# Patient Record
Sex: FEMALE | Race: BLACK OR AFRICAN AMERICAN | NOT HISPANIC OR LATINO | URBAN - METROPOLITAN AREA
[De-identification: names, ages, dates, MRNs, and addresses within clinical notes are randomized per-mention and may not be internally consistent; named-entity substitution may affect disease eponyms.]

---

## 2022-07-03 ENCOUNTER — INPATIENT (INPATIENT)
Facility: HOSPITAL | Age: 32
LOS: 4 days | Discharge: HOME | End: 2022-07-08
Attending: STUDENT IN AN ORGANIZED HEALTH CARE EDUCATION/TRAINING PROGRAM | Admitting: STUDENT IN AN ORGANIZED HEALTH CARE EDUCATION/TRAINING PROGRAM

## 2022-07-03 VITALS
TEMPERATURE: 98 F | RESPIRATION RATE: 17 BRPM | OXYGEN SATURATION: 100 % | HEART RATE: 113 BPM | SYSTOLIC BLOOD PRESSURE: 129 MMHG | DIASTOLIC BLOOD PRESSURE: 71 MMHG

## 2022-07-03 LAB
ALBUMIN SERPL ELPH-MCNC: 4.5 G/DL — SIGNIFICANT CHANGE UP (ref 3.3–5)
ALP SERPL-CCNC: 111 U/L — SIGNIFICANT CHANGE UP (ref 40–120)
ALT FLD-CCNC: 310 U/L — HIGH (ref 4–33)
ANION GAP SERPL CALC-SCNC: 19 MMOL/L — HIGH (ref 7–14)
APPEARANCE UR: ABNORMAL
AST SERPL-CCNC: 160 U/L — HIGH (ref 4–32)
BACTERIA # UR AUTO: NEGATIVE — SIGNIFICANT CHANGE UP
BASOPHILS # BLD AUTO: 0.02 K/UL — SIGNIFICANT CHANGE UP (ref 0–0.2)
BASOPHILS NFR BLD AUTO: 0.2 % — SIGNIFICANT CHANGE UP (ref 0–2)
BILIRUB SERPL-MCNC: 4.3 MG/DL — HIGH (ref 0.2–1.2)
BILIRUB UR-MCNC: ABNORMAL
BUN SERPL-MCNC: 8 MG/DL — SIGNIFICANT CHANGE UP (ref 7–23)
CALCIUM SERPL-MCNC: 9.5 MG/DL — SIGNIFICANT CHANGE UP (ref 8.4–10.5)
CHLORIDE SERPL-SCNC: 102 MMOL/L — SIGNIFICANT CHANGE UP (ref 98–107)
CO2 SERPL-SCNC: 18 MMOL/L — LOW (ref 22–31)
COLOR SPEC: ABNORMAL
CREAT SERPL-MCNC: 0.67 MG/DL — SIGNIFICANT CHANGE UP (ref 0.5–1.3)
DIFF PNL FLD: NEGATIVE — SIGNIFICANT CHANGE UP
EGFR: 120 ML/MIN/1.73M2 — SIGNIFICANT CHANGE UP
EOSINOPHIL # BLD AUTO: 0.01 K/UL — SIGNIFICANT CHANGE UP (ref 0–0.5)
EOSINOPHIL NFR BLD AUTO: 0.1 % — SIGNIFICANT CHANGE UP (ref 0–6)
EPI CELLS # UR: 1 /HPF — SIGNIFICANT CHANGE UP (ref 0–5)
FLUAV AG NPH QL: SIGNIFICANT CHANGE UP
FLUBV AG NPH QL: SIGNIFICANT CHANGE UP
GLUCOSE SERPL-MCNC: 162 MG/DL — HIGH (ref 70–99)
GLUCOSE UR QL: NEGATIVE — SIGNIFICANT CHANGE UP
HCT VFR BLD CALC: 39.6 % — SIGNIFICANT CHANGE UP (ref 34.5–45)
HGB BLD-MCNC: 13.1 G/DL — SIGNIFICANT CHANGE UP (ref 11.5–15.5)
HYALINE CASTS # UR AUTO: 2 /LPF — SIGNIFICANT CHANGE UP (ref 0–7)
IANC: 9.12 K/UL — HIGH (ref 1.8–7.4)
IMM GRANULOCYTES NFR BLD AUTO: 0.4 % — SIGNIFICANT CHANGE UP (ref 0–1.5)
KETONES UR-MCNC: ABNORMAL
LEUKOCYTE ESTERASE UR-ACNC: NEGATIVE — SIGNIFICANT CHANGE UP
LIDOCAIN IGE QN: >3000 U/L — HIGH (ref 7–60)
LYMPHOCYTES # BLD AUTO: 1.12 K/UL — SIGNIFICANT CHANGE UP (ref 1–3.3)
LYMPHOCYTES # BLD AUTO: 9.9 % — LOW (ref 13–44)
MCHC RBC-ENTMCNC: 28.1 PG — SIGNIFICANT CHANGE UP (ref 27–34)
MCHC RBC-ENTMCNC: 33.1 GM/DL — SIGNIFICANT CHANGE UP (ref 32–36)
MCV RBC AUTO: 84.8 FL — SIGNIFICANT CHANGE UP (ref 80–100)
MONOCYTES # BLD AUTO: 0.98 K/UL — HIGH (ref 0–0.9)
MONOCYTES NFR BLD AUTO: 8.7 % — SIGNIFICANT CHANGE UP (ref 2–14)
NEUTROPHILS # BLD AUTO: 9.12 K/UL — HIGH (ref 1.8–7.4)
NEUTROPHILS NFR BLD AUTO: 80.7 % — HIGH (ref 43–77)
NITRITE UR-MCNC: NEGATIVE — SIGNIFICANT CHANGE UP
NRBC # BLD: 0 /100 WBCS — SIGNIFICANT CHANGE UP
NRBC # FLD: 0 K/UL — SIGNIFICANT CHANGE UP
PH UR: 6.5 — SIGNIFICANT CHANGE UP (ref 5–8)
PLATELET # BLD AUTO: 366 K/UL — SIGNIFICANT CHANGE UP (ref 150–400)
POTASSIUM SERPL-MCNC: 3.2 MMOL/L — LOW (ref 3.5–5.3)
POTASSIUM SERPL-SCNC: 3.2 MMOL/L — LOW (ref 3.5–5.3)
PROT SERPL-MCNC: 8.5 G/DL — HIGH (ref 6–8.3)
PROT UR-MCNC: ABNORMAL
RBC # BLD: 4.67 M/UL — SIGNIFICANT CHANGE UP (ref 3.8–5.2)
RBC # FLD: 12.2 % — SIGNIFICANT CHANGE UP (ref 10.3–14.5)
RBC CASTS # UR COMP ASSIST: 1 /HPF — SIGNIFICANT CHANGE UP (ref 0–4)
RSV RNA NPH QL NAA+NON-PROBE: SIGNIFICANT CHANGE UP
SARS-COV-2 RNA SPEC QL NAA+PROBE: SIGNIFICANT CHANGE UP
SODIUM SERPL-SCNC: 139 MMOL/L — SIGNIFICANT CHANGE UP (ref 135–145)
SP GR SPEC: 1.02 — SIGNIFICANT CHANGE UP (ref 1–1.05)
UROBILINOGEN FLD QL: ABNORMAL
WBC # BLD: 11.29 K/UL — HIGH (ref 3.8–10.5)
WBC # FLD AUTO: 11.29 K/UL — HIGH (ref 3.8–10.5)
WBC UR QL: 1 /HPF — SIGNIFICANT CHANGE UP (ref 0–5)

## 2022-07-03 PROCEDURE — 99285 EMERGENCY DEPT VISIT HI MDM: CPT

## 2022-07-03 PROCEDURE — 76705 ECHO EXAM OF ABDOMEN: CPT | Mod: 26

## 2022-07-03 RX ORDER — PIPERACILLIN AND TAZOBACTAM 4; .5 G/20ML; G/20ML
3.38 INJECTION, POWDER, LYOPHILIZED, FOR SOLUTION INTRAVENOUS ONCE
Refills: 0 | Status: COMPLETED | OUTPATIENT
Start: 2022-07-03 | End: 2022-07-03

## 2022-07-03 RX ORDER — SODIUM,POTASSIUM PHOSPHATES 278-250MG
250 POWDER IN PACKET (EA) ORAL ONCE
Refills: 0 | Status: DISCONTINUED | OUTPATIENT
Start: 2022-07-03 | End: 2022-07-03

## 2022-07-03 RX ORDER — MORPHINE SULFATE 50 MG/1
4 CAPSULE, EXTENDED RELEASE ORAL ONCE
Refills: 0 | Status: DISCONTINUED | OUTPATIENT
Start: 2022-07-03 | End: 2022-07-03

## 2022-07-03 RX ORDER — ONDANSETRON 8 MG/1
4 TABLET, FILM COATED ORAL ONCE
Refills: 0 | Status: COMPLETED | OUTPATIENT
Start: 2022-07-03 | End: 2022-07-03

## 2022-07-03 RX ORDER — ACETAMINOPHEN 500 MG
1000 TABLET ORAL ONCE
Refills: 0 | Status: COMPLETED | OUTPATIENT
Start: 2022-07-03 | End: 2022-07-03

## 2022-07-03 RX ORDER — SODIUM CHLORIDE 9 MG/ML
1000 INJECTION INTRAMUSCULAR; INTRAVENOUS; SUBCUTANEOUS ONCE
Refills: 0 | Status: COMPLETED | OUTPATIENT
Start: 2022-07-03 | End: 2022-07-03

## 2022-07-03 RX ORDER — POTASSIUM CHLORIDE 20 MEQ
40 PACKET (EA) ORAL ONCE
Refills: 0 | Status: COMPLETED | OUTPATIENT
Start: 2022-07-03 | End: 2022-07-03

## 2022-07-03 RX ORDER — SODIUM,POTASSIUM PHOSPHATES 278-250MG
1 POWDER IN PACKET (EA) ORAL ONCE
Refills: 0 | Status: COMPLETED | OUTPATIENT
Start: 2022-07-03 | End: 2022-07-03

## 2022-07-03 RX ORDER — FAMOTIDINE 10 MG/ML
20 INJECTION INTRAVENOUS ONCE
Refills: 0 | Status: COMPLETED | OUTPATIENT
Start: 2022-07-03 | End: 2022-07-03

## 2022-07-03 RX ORDER — METOCLOPRAMIDE HCL 10 MG
10 TABLET ORAL ONCE
Refills: 0 | Status: COMPLETED | OUTPATIENT
Start: 2022-07-03 | End: 2022-07-03

## 2022-07-03 RX ORDER — DIPHENHYDRAMINE HCL 50 MG
50 CAPSULE ORAL ONCE
Refills: 0 | Status: COMPLETED | OUTPATIENT
Start: 2022-07-03 | End: 2022-07-03

## 2022-07-03 RX ADMIN — Medication 50 MILLIGRAM(S): at 19:46

## 2022-07-03 RX ADMIN — PIPERACILLIN AND TAZOBACTAM 200 GRAM(S): 4; .5 INJECTION, POWDER, LYOPHILIZED, FOR SOLUTION INTRAVENOUS at 23:45

## 2022-07-03 RX ADMIN — FAMOTIDINE 20 MILLIGRAM(S): 10 INJECTION INTRAVENOUS at 19:46

## 2022-07-03 RX ADMIN — MORPHINE SULFATE 4 MILLIGRAM(S): 50 CAPSULE, EXTENDED RELEASE ORAL at 21:22

## 2022-07-03 RX ADMIN — Medication 1 TABLET(S): at 22:59

## 2022-07-03 RX ADMIN — SODIUM CHLORIDE 1000 MILLILITER(S): 9 INJECTION INTRAMUSCULAR; INTRAVENOUS; SUBCUTANEOUS at 21:00

## 2022-07-03 RX ADMIN — ONDANSETRON 4 MILLIGRAM(S): 8 TABLET, FILM COATED ORAL at 20:38

## 2022-07-03 RX ADMIN — SODIUM CHLORIDE 1000 MILLILITER(S): 9 INJECTION INTRAMUSCULAR; INTRAVENOUS; SUBCUTANEOUS at 19:45

## 2022-07-03 RX ADMIN — Medication 40 MILLIEQUIVALENT(S): at 21:01

## 2022-07-03 RX ADMIN — MORPHINE SULFATE 4 MILLIGRAM(S): 50 CAPSULE, EXTENDED RELEASE ORAL at 23:31

## 2022-07-03 RX ADMIN — Medication 10 MILLIGRAM(S): at 19:46

## 2022-07-03 RX ADMIN — Medication 400 MILLIGRAM(S): at 21:00

## 2022-07-03 NOTE — ED PROVIDER NOTE - CLINICAL SUMMARY MEDICAL DECISION MAKING FREE TEXT BOX
Ezequiel Peoples MD (PGY-3): The patient is a 31y Female with no sig pmhx complaining of nausea, vomiting, and abd pain. Concern for pancreatitis, cholecystitis, UTI/pyelonephritis, KS, gastritis. ekg, RUQ US, cxr, u/a, labs, zofran, pain meds, IVF. Dispo tbd.

## 2022-07-03 NOTE — ED ADULT NURSE REASSESSMENT NOTE - INTERVENTIONS DEFINITIONS
Instruct patient to call for assistance/Monitor gait and stability/Reinforce activity limits and safety measures with patient and family

## 2022-07-03 NOTE — ED PROVIDER NOTE - PHYSICAL EXAMINATION
Gen: In moderate distress. A&Ox4. Non-toxic appearing.  HEENT: Normocephalic and atraumatic. PERRL, EOMI, no nasal discharge, mucous membranes dry, no scleral icterus.  CV: Regular rate and rhythm. S1/S2, no M/R/G. No significant lower extremity edema. Radial and DP pulses present and symmetrical. Capillary refill less than 2 seconds.  Resp: Normal effort and rate. CTAB, no rales, rhonchi, or wheezes.  GI: Abdomen soft, non-distended; TTP in epigastric area, RUQ, and suprapubic area. No masses appreciated. Bowel sounds present.  MSK: No open wounds and no bruising. No CVAT bilaterally.  Neuro: Following commands, speaking in full sentences, moving extremities spontaneously  Psych: Appropriate mood, cooperative

## 2022-07-03 NOTE — ED PROVIDER NOTE - OBJECTIVE STATEMENT
The patient is a 31y Female with no sig pmhx complaining of vomiting. Says that 2 days ago, developed sudden onset nausea, vomiting, and diffuse abd pain. Pain started hours after eating last meal. Vomit is non-bloody, nonbilious. No hx of abd surgeries. No hx of cholelithiasis. Denies any head trauma. Pt sexually active, unsure if she is pregnant. Denies hematuria or dysuria. No illicit drug use. Has vomited multiple times since Friday, decreased PO intake, only able to tolerate some tea today. Normal BMs. No fevers, sick contacts, cp, sob, diarrhea, leg swelling. NKDA. The patient is a 31y Female with no sig pmhx complaining of nausea, vomiting, and abd pain. Says that 2 days ago, developed sudden onset nausea, vomiting, and diffuse abd pain. Pain started hours after eating last meal. Vomit is non-bloody, nonbilious. No hx of abd surgeries. No hx of cholelithiasis. Denies any head trauma. Pt sexually active, unsure if she is pregnant. Denies hematuria or dysuria. No illicit drug use. Has vomited multiple times since Friday, decreased PO intake, only able to tolerate some tea today. Normal BMs. No fevers, sick contacts, cp, sob, diarrhea, leg swelling. NKDA.

## 2022-07-03 NOTE — ED ADULT NURSE REASSESSMENT NOTE - NS ED NURSE REASSESS COMMENT FT1
Received report from Day RN Joanne Boo: Pt A&Ox4, ambulatory at baseline, complaining of abdominal pain, VS noted, respirations are even and unlabored, MD Munoz made aware, medicated as MD orders, Safety precautions implemented as per protocol, awaiting further MD orders, will continue to monitor.

## 2022-07-03 NOTE — ED PROVIDER NOTE - ATTENDING CONTRIBUTION TO CARE
I (Gayla) agree with above, I performed a history and physical. Counseled mony medical staff, physician assistant, and/or medical student on medical decision making as documented. Medical decisions and treatment interventions were made in real time during the patient encounter. Additionally and/or with the following exceptions: The patient presented to the ED with severe abdomen pain and nausea and vomiting. had tenderness to palpation in ruq/epigastrium, uncomfortable appearing, eventally required morphine. complete blood count within normal limits, complete metabolic panel with elevated lft's and lipase > 3000, right upper quadrant with tenderness to palpation, found to have cholecystitis as well, raising concern for gall stone pancreatitis. The patient was signed out to the oncoming attending pending the following: surgery consult and definite need for admission. The patient's condition was not amenable to outpatient treatment due either the lack of feasibility of outpatient care coordination, possibility for further decompensation with adverse outcome if discharge, or treatments and diagnostic  modalities only available during an inpatient hospitalization.

## 2022-07-03 NOTE — ED ADULT NURSE REASSESSMENT NOTE - NS ED NURSE REASSESS COMMENT FT1
Patient unable to give urine samples for urine pregancy, Dr. Shukla made aware. Patient medicated as per Dr. Shukla's order, confirmed w/MD for safety for pregnancy. Patient unable to give urine samples for urine pregancy, Dr. Shukla made aware. Patient medicated as per Dr. Shukla's order, confirmed w/MD for medications safety for pregnancy.

## 2022-07-03 NOTE — ED ADULT NURSE NOTE - CHIEF COMPLAINT QUOTE
Pt arrives ambulatory to triage c/o severe N/V x2 days, states "it's a bad stomach bug." Denies CP/SOB/fevers. Pt loudly crying and keeled over in triage, throwing up saliva. Denies PMH.

## 2022-07-03 NOTE — ED ADULT NURSE REASSESSMENT NOTE - NS ED NURSE REASSESS COMMENT FT1
Triage RN - Pt. states she felt weak and fell onto hands. Denies LOC, hitting head, use of blood thinners. Pt. A&Ox4. Evaluated by MD Shukla.

## 2022-07-03 NOTE — ED ADULT TRIAGE NOTE - CHIEF COMPLAINT QUOTE
Pt arrives ambulatory to triage c/o severe N/V x2 days, states "it's a bad stomach bug." Denies CP/SOB/fevers. Pt crying and keeled over in triage. Denies PMH. Pt arrives ambulatory to triage c/o severe N/V x2 days, states "it's a bad stomach bug." Denies CP/SOB/fevers. Pt loudly crying and keeled over in triage, throwing up saliva. Denies PMH.

## 2022-07-04 DIAGNOSIS — K81.9 CHOLECYSTITIS, UNSPECIFIED: ICD-10-CM

## 2022-07-04 LAB
ALBUMIN SERPL ELPH-MCNC: 4.2 G/DL — SIGNIFICANT CHANGE UP (ref 3.3–5)
ALP SERPL-CCNC: 106 U/L — SIGNIFICANT CHANGE UP (ref 40–120)
ALT FLD-CCNC: 252 U/L — HIGH (ref 4–33)
ANION GAP SERPL CALC-SCNC: 15 MMOL/L — HIGH (ref 7–14)
APTT BLD: 28.3 SEC — SIGNIFICANT CHANGE UP (ref 27–36.3)
AST SERPL-CCNC: 104 U/L — HIGH (ref 4–32)
BILIRUB DIRECT SERPL-MCNC: 1 MG/DL — HIGH (ref 0–0.3)
BILIRUB INDIRECT FLD-MCNC: 0.7 MG/DL — SIGNIFICANT CHANGE UP (ref 0–1)
BILIRUB SERPL-MCNC: 1.7 MG/DL — HIGH (ref 0.2–1.2)
BLD GP AB SCN SERPL QL: NEGATIVE — SIGNIFICANT CHANGE UP
BUN SERPL-MCNC: 9 MG/DL — SIGNIFICANT CHANGE UP (ref 7–23)
CALCIUM SERPL-MCNC: 8.7 MG/DL — SIGNIFICANT CHANGE UP (ref 8.4–10.5)
CHLORIDE SERPL-SCNC: 105 MMOL/L — SIGNIFICANT CHANGE UP (ref 98–107)
CO2 SERPL-SCNC: 20 MMOL/L — LOW (ref 22–31)
CREAT SERPL-MCNC: 0.74 MG/DL — SIGNIFICANT CHANGE UP (ref 0.5–1.3)
EGFR: 111 ML/MIN/1.73M2 — SIGNIFICANT CHANGE UP
GLUCOSE SERPL-MCNC: 108 MG/DL — HIGH (ref 70–99)
HCT VFR BLD CALC: 38.6 % — SIGNIFICANT CHANGE UP (ref 34.5–45)
HGB BLD-MCNC: 12.6 G/DL — SIGNIFICANT CHANGE UP (ref 11.5–15.5)
INR BLD: 1.34 RATIO — HIGH (ref 0.88–1.16)
MAGNESIUM SERPL-MCNC: 1.8 MG/DL — SIGNIFICANT CHANGE UP (ref 1.6–2.6)
MCHC RBC-ENTMCNC: 28.1 PG — SIGNIFICANT CHANGE UP (ref 27–34)
MCHC RBC-ENTMCNC: 32.6 GM/DL — SIGNIFICANT CHANGE UP (ref 32–36)
MCV RBC AUTO: 86.2 FL — SIGNIFICANT CHANGE UP (ref 80–100)
NRBC # BLD: 0 /100 WBCS — SIGNIFICANT CHANGE UP
NRBC # FLD: 0 K/UL — SIGNIFICANT CHANGE UP
PHOSPHATE SERPL-MCNC: 3.7 MG/DL — SIGNIFICANT CHANGE UP (ref 2.5–4.5)
PLATELET # BLD AUTO: 348 K/UL — SIGNIFICANT CHANGE UP (ref 150–400)
POTASSIUM SERPL-MCNC: 4.1 MMOL/L — SIGNIFICANT CHANGE UP (ref 3.5–5.3)
POTASSIUM SERPL-SCNC: 4.1 MMOL/L — SIGNIFICANT CHANGE UP (ref 3.5–5.3)
PROT SERPL-MCNC: 7.9 G/DL — SIGNIFICANT CHANGE UP (ref 6–8.3)
PROTHROM AB SERPL-ACNC: 15.6 SEC — HIGH (ref 10.5–13.4)
RBC # BLD: 4.48 M/UL — SIGNIFICANT CHANGE UP (ref 3.8–5.2)
RBC # FLD: 12.6 % — SIGNIFICANT CHANGE UP (ref 10.3–14.5)
RH IG SCN BLD-IMP: POSITIVE — SIGNIFICANT CHANGE UP
SODIUM SERPL-SCNC: 140 MMOL/L — SIGNIFICANT CHANGE UP (ref 135–145)
TRIGL SERPL-MCNC: 40 MG/DL — SIGNIFICANT CHANGE UP
WBC # BLD: 10.72 K/UL — HIGH (ref 3.8–10.5)
WBC # FLD AUTO: 10.72 K/UL — HIGH (ref 3.8–10.5)

## 2022-07-04 PROCEDURE — 74177 CT ABD & PELVIS W/CONTRAST: CPT | Mod: 26,QQ

## 2022-07-04 PROCEDURE — 71045 X-RAY EXAM CHEST 1 VIEW: CPT | Mod: 26

## 2022-07-04 PROCEDURE — 99223 1ST HOSP IP/OBS HIGH 75: CPT

## 2022-07-04 PROCEDURE — 99222 1ST HOSP IP/OBS MODERATE 55: CPT | Mod: 57

## 2022-07-04 RX ORDER — ONDANSETRON 8 MG/1
4 TABLET, FILM COATED ORAL EVERY 8 HOURS
Refills: 0 | Status: DISCONTINUED | OUTPATIENT
Start: 2022-07-04 | End: 2022-07-07

## 2022-07-04 RX ORDER — METOCLOPRAMIDE HCL 10 MG
10 TABLET ORAL EVERY 6 HOURS
Refills: 0 | Status: DISCONTINUED | OUTPATIENT
Start: 2022-07-04 | End: 2022-07-07

## 2022-07-04 RX ORDER — HYDROMORPHONE HYDROCHLORIDE 2 MG/ML
0.5 INJECTION INTRAMUSCULAR; INTRAVENOUS; SUBCUTANEOUS EVERY 4 HOURS
Refills: 0 | Status: DISCONTINUED | OUTPATIENT
Start: 2022-07-04 | End: 2022-07-07

## 2022-07-04 RX ORDER — ENOXAPARIN SODIUM 100 MG/ML
40 INJECTION SUBCUTANEOUS EVERY 24 HOURS
Refills: 0 | Status: DISCONTINUED | OUTPATIENT
Start: 2022-07-04 | End: 2022-07-04

## 2022-07-04 RX ORDER — PIPERACILLIN AND TAZOBACTAM 4; .5 G/20ML; G/20ML
3.38 INJECTION, POWDER, LYOPHILIZED, FOR SOLUTION INTRAVENOUS EVERY 8 HOURS
Refills: 0 | Status: DISCONTINUED | OUTPATIENT
Start: 2022-07-04 | End: 2022-07-07

## 2022-07-04 RX ORDER — SODIUM CHLORIDE 9 MG/ML
1000 INJECTION, SOLUTION INTRAVENOUS
Refills: 0 | Status: DISCONTINUED | OUTPATIENT
Start: 2022-07-04 | End: 2022-07-07

## 2022-07-04 RX ORDER — MAGNESIUM SULFATE 500 MG/ML
1 VIAL (ML) INJECTION ONCE
Refills: 0 | Status: COMPLETED | OUTPATIENT
Start: 2022-07-04 | End: 2022-07-04

## 2022-07-04 RX ORDER — HYDROMORPHONE HYDROCHLORIDE 2 MG/ML
0.25 INJECTION INTRAMUSCULAR; INTRAVENOUS; SUBCUTANEOUS EVERY 4 HOURS
Refills: 0 | Status: DISCONTINUED | OUTPATIENT
Start: 2022-07-04 | End: 2022-07-07

## 2022-07-04 RX ORDER — ACETAMINOPHEN 500 MG
1000 TABLET ORAL EVERY 6 HOURS
Refills: 0 | Status: COMPLETED | OUTPATIENT
Start: 2022-07-04 | End: 2022-07-05

## 2022-07-04 RX ADMIN — SODIUM CHLORIDE 150 MILLILITER(S): 9 INJECTION, SOLUTION INTRAVENOUS at 08:56

## 2022-07-04 RX ADMIN — Medication 1000 MILLIGRAM(S): at 22:00

## 2022-07-04 RX ADMIN — PIPERACILLIN AND TAZOBACTAM 25 GRAM(S): 4; .5 INJECTION, POWDER, LYOPHILIZED, FOR SOLUTION INTRAVENOUS at 16:47

## 2022-07-04 RX ADMIN — MORPHINE SULFATE 4 MILLIGRAM(S): 50 CAPSULE, EXTENDED RELEASE ORAL at 00:36

## 2022-07-04 RX ADMIN — Medication 400 MILLIGRAM(S): at 08:56

## 2022-07-04 RX ADMIN — MORPHINE SULFATE 4 MILLIGRAM(S): 50 CAPSULE, EXTENDED RELEASE ORAL at 04:35

## 2022-07-04 RX ADMIN — Medication 10 MILLIGRAM(S): at 10:50

## 2022-07-04 RX ADMIN — SODIUM CHLORIDE 150 MILLILITER(S): 9 INJECTION, SOLUTION INTRAVENOUS at 05:53

## 2022-07-04 RX ADMIN — HYDROMORPHONE HYDROCHLORIDE 0.5 MILLIGRAM(S): 2 INJECTION INTRAMUSCULAR; INTRAVENOUS; SUBCUTANEOUS at 10:50

## 2022-07-04 RX ADMIN — PIPERACILLIN AND TAZOBACTAM 25 GRAM(S): 4; .5 INJECTION, POWDER, LYOPHILIZED, FOR SOLUTION INTRAVENOUS at 08:55

## 2022-07-04 RX ADMIN — HYDROMORPHONE HYDROCHLORIDE 0.5 MILLIGRAM(S): 2 INJECTION INTRAMUSCULAR; INTRAVENOUS; SUBCUTANEOUS at 18:30

## 2022-07-04 RX ADMIN — ENOXAPARIN SODIUM 40 MILLIGRAM(S): 100 INJECTION SUBCUTANEOUS at 16:48

## 2022-07-04 RX ADMIN — Medication 100 GRAM(S): at 14:51

## 2022-07-04 RX ADMIN — HYDROMORPHONE HYDROCHLORIDE 0.5 MILLIGRAM(S): 2 INJECTION INTRAMUSCULAR; INTRAVENOUS; SUBCUTANEOUS at 11:17

## 2022-07-04 RX ADMIN — PIPERACILLIN AND TAZOBACTAM 25 GRAM(S): 4; .5 INJECTION, POWDER, LYOPHILIZED, FOR SOLUTION INTRAVENOUS at 23:49

## 2022-07-04 RX ADMIN — ONDANSETRON 4 MILLIGRAM(S): 8 TABLET, FILM COATED ORAL at 06:28

## 2022-07-04 RX ADMIN — ONDANSETRON 4 MILLIGRAM(S): 8 TABLET, FILM COATED ORAL at 16:47

## 2022-07-04 RX ADMIN — HYDROMORPHONE HYDROCHLORIDE 0.5 MILLIGRAM(S): 2 INJECTION INTRAMUSCULAR; INTRAVENOUS; SUBCUTANEOUS at 18:45

## 2022-07-04 RX ADMIN — Medication 400 MILLIGRAM(S): at 21:28

## 2022-07-04 RX ADMIN — Medication 400 MILLIGRAM(S): at 14:51

## 2022-07-04 NOTE — H&P ADULT - NSHPLABSRESULTS_GEN_ALL_CORE
LABS:                          13.1   11.29 )-----------( 366      ( 2022 19:25 )             39.6       07-03    139  |  102  |  8   ----------------------------<  162<H>  3.2<L>   |  18<L>  |  0.67    Ca    9.5      2022 19:25  Phos  1.2     07-  Mg     1.60     07-    TPro  8.5<H>  /  Alb  4.5  /  TBili  4.3<H>  /  DBili  x   /  AST  160<H>  /  ALT  310<H>  /  AlkPhos  111  07-03          Urinalysis Basic - ( 2022 20:40 )    Color: Lisa / Appearance: Slightly Turbid / S.017 / pH: x  Gluc: x / Ketone: Large  / Bili: Small / Urobili: 3 mg/dL   Blood: x / Protein: Trace / Nitrite: Negative   Leuk Esterase: Negative / RBC: 1 /HPF / WBC 1 /HPF   Sq Epi: x / Non Sq Epi: 1 /HPF / Bacteria: Negative      PT/INR - ( 2022 01:25 )   PT: 15.6 sec;   INR: 1.34 ratio      PTT - ( 2022 01:25 )  PTT:28.3 sec    _______________________________________  RADIOLOGY & ADDITIONAL STUDIES:    < from: CT Abdomen and Pelvis w/ IV Cont (22 @ 02:15) >    IMPRESSION:  Gallstone pancreatitis.    Small volume abdominopelvic ascites.    Indeterminate structure seen on prior ultrasound likely corresponding to   bowel.    < end of copied text >

## 2022-07-04 NOTE — H&P ADULT - HISTORY OF PRESENT ILLNESS
32yo F with no PMH presenting for 3 days of severe epigastric pain. Patient endorses sudden onset RUQ and epigastric pain that began on Friday. Associated with nausea, vomiting and inability to tolerate PO. Denies fevers or chills. Denies diarrhea. States she had similar pain during pregnancy.    In the ED, patient initially tachycardic to 110s, improved to 90s after 2L IVF. Labs showed WBC 11.3, tbili 4.3, AST//310, lipase >3000. RUQ US showed gallstones with gallbladder wall thickening and pericholecystic fluid with a 5cm echogenic vascular mass between the right hepatic lobe and right kidney. CBD not visualized. CT was performed which showed gallstone pancreatitis and previously seen mass on US was not visualized.

## 2022-07-04 NOTE — PROGRESS NOTE ADULT - ASSESSMENT
30yo F with no PMH presenting for 3 days of severe epigastric pain found to have acute cholecystitis, gallstone pancreatitis and possible choledocholithiasis. Currently hemodynamically stable.    PLAN:  - Admit to B team surgery under Dr. Gabriela Jacinto, floor bed  - NPO/IVF  - Zosyn  - MRCP to evaluate for choledocho given elevated tbili  - GI consulted via email  - Trend LFTs and tbili  - Monitor abdominal exam    B Team Surgery 30yo F with no PMH presenting for 3 days of severe epigastric pain found to have acute cholecystitis, gallstone pancreatitis and possible choledocholithiasis. Currently hemodynamically stable.    PLAN:  - Admit to B team surgery under Dr. Gabriela Jacinto, floor bed  - IVF  - consider transition to clear liquid diet 7/4 then NPO at midnight for ERCP  - Zosyn  - MRCP to evaluate for choledocho given elevated tbili  - GI consulted via email, and called for ERCP. Scheduled tentatively for tuesday  - hold VTE ppx for ERCP  - Trend LFTs and tbili  - Monitor abdominal exam    B Team Surgery

## 2022-07-04 NOTE — PATIENT PROFILE ADULT - FALL HARM RISK - HARM RISK INTERVENTIONS

## 2022-07-04 NOTE — H&P ADULT - NSHPPHYSICALEXAM_GEN_ALL_CORE
VITALS:  Vital Signs Last 24 Hrs  T(C): 37.1 (04 Jul 2022 04:07), Max: 37.1 (04 Jul 2022 04:07)  T(F): 98.8 (04 Jul 2022 04:07), Max: 98.8 (04 Jul 2022 04:07)  HR: 100 (04 Jul 2022 04:07) (93 - 113)  BP: 148/88 (04 Jul 2022 04:07) (129/71 - 148/88)  BP(mean): --  RR: 17 (04 Jul 2022 04:07) (16 - 18)  SpO2: 98% (04 Jul 2022 04:07) (98% - 100%)    PHYSICAL EXAM:  Gen: No acute distress.  Abd: Soft, nondistended, mild epigastric and periumbilical tenderness, no rebound, no guarding.  Ext: Warm and well-perfused

## 2022-07-04 NOTE — H&P ADULT - ATTENDING COMMENTS
I have reviewed the history, pertinent labs and imaging, and discussed the care with the consult resident.  More than 50% of this 55 minute encounter including face to face with the patient was spent counseling and/or coordination of care on gallstone pancreatitis.  Time included review of vitals, labs, imaging, discussion with consultants and coordination with the operating room/emergency department.    30yo F with 3d RUQ/epigastric pain. T bili 4, lipase > 3000. US with gallstones. CT with edematous pancreas, ascites, and thickened gallbladder. Pt with gallstone pancreatitis, with possible concurrent choledocholithiasis +/- cholecystitis.     - MRCP/GI consult to evaluate for choledocholithiasis   - Blood cultures   - NPO, IV hydration   - Pain control   - Pt would benefit from cholecystectomy, however timing is TBD based on further work up and clinical course     The active issues are:  1. gallstone pancreatitis   2. possible choledocholithiasis     The Acute Care Surgery (B Team) Attending Group Practice:  Dr. Gabriela Jacinto    urgent issues - spectra [id number]  nonurgent issues - [phone]  patient appointments or afterhours - [phone]

## 2022-07-04 NOTE — CONSULT NOTE ADULT - ASSESSMENT
TANJA HORN is a 31y Female with no PMH presenting for 3 days of severe epigastric pain. Patient endorses sudden onset RUQ and epigastric pain that began on Friday worse with po c/b nausea and NBNB vomiting found to have gallstone pancreatitis, choledocho, and acute cholecystitis.    # Gallstone pancreatitis  # Choledocholithiasis  # Ac Cholecystitis  -  CT gallstones w/i CBD, gallstones with GB wall thickening and pericholecystic fluid and an edematous pancreas.     Recommendations:  - f/u MRCP  - Tentatively plan for ERCP tomorrow  - NPO after midnight  - 3 AM CBC, CMP, coags; correct electrolyte derangements  - Transfuse if Hgb < 7 or hct < 30  - c/w Abx  - Ensure adequate hydration  - Rest of care per primary    Preliminary note until signed by Attending.    Thank you for involving us in this patient's care.    Gita Doherty MD  Gastroenterology/Hepatology Fellow, PGY-VI    NON-URGENT CONSULTS:  Please email [email or web address]  [email or web address]  AT NIGHT AND ON WEEKENDS:  Contact on-call GI fellow via answering service ([phone]) from 5pm-8am and on weekends/holidays  MONDAY-FRIDAY 8AM-5PM:  Pager# [phone] ([facility])  GI Phone# [phone] ([facility])      TANJA HORN is a 31y Female with no PMH presenting for 3 days of severe epigastric pain. Patient endorses sudden onset RUQ and epigastric pain that began on Friday worse with po c/b nausea and NBNB vomiting found to have gallstone pancreatitis, choledocho, and acute cholecystitis.    # Gallstone pancreatitis  # Choledocholithiasis  # Ac Cholecystitis  -  CT gallstones w/i CBD, gallstones with GB wall thickening and pericholecystic fluid and an edematous pancreas.     Recommendations:  - f/u MRCP  - Tentatively plan for ERCP tomorrow  - CLD if able to tolerate, otherwise keep NPO  - NPO after midnight  - 3 AM CBC, CMP, coags; correct electrolyte derangements  - Transfuse if Hgb < 7 or hct < 30  - c/w Abx  - Ensure adequate hydration  - Rest of care per primary    Preliminary note until signed by Attending.    Thank you for involving us in this patient's care.    Gita Doherty MD  Gastroenterology/Hepatology Fellow, PGY-VI    NON-URGENT CONSULTS:  Please email [email or web address]  [email or web address]  AT NIGHT AND ON WEEKENDS:  Contact on-call GI fellow via answering service ([phone]) from 5pm-8am and on weekends/holidays  MONDAY-FRIDAY 8AM-5PM:  Pager# [phone] ([facility])  GI Phone# [phone] ([facility])

## 2022-07-04 NOTE — CONSULT NOTE ADULT - SUBJECTIVE AND OBJECTIVE BOX
Chief Complaint:  Patient is a 31y old  Female who presents with a chief complaint of Acute cholecystitis, gallstone pancreatitis and choledocholithiasis (04 Jul 2022 05:08)      HPI:TANJA HORN is a 31y Female    PMHX/PSHX:  No pertinent past medical history    No significant past surgical history      Allergies:  No Known Allergies      Home Medications: reviewed  Hospital Medications:  acetaminophen   IVPB .. 1000 milliGRAM(s) IV Intermittent every 6 hours  enoxaparin Injectable 40 milliGRAM(s) SubCutaneous every 24 hours  lactated ringers. 1000 milliLiter(s) IV Continuous <Continuous>  ondansetron Injectable 4 milliGRAM(s) IV Push every 8 hours PRN  piperacillin/tazobactam IVPB.. 3.375 Gram(s) IV Intermittent every 8 hours      Social History:   Tob: Denies  EtOH: Denies  Illicit Drugs: Denies    Family history:    Denies family history of colon cancer/polyps, stomach cancer/polyps, pancreatic cancer/masses, liver cancer/disease, ovarian cancer and endometrial cancer.    ROS:   General:  No  fevers, chills, night sweats, fatigue  Eyes:  Good vision, no reported pain  ENT:  No sore throat, pain, runny nose  CV:  No pain, palpitations  Pulm:  No dyspnea, cough  GI:  See HPI, otherwise negative  :  No  incontinence, nocturia  Muscle:  No pain, weakness  Neuro:  No memory problems  Psych:  No insomnia, mood problems, depression  Endocrine:  No polyuria, polydipsia, cold/heat intolerance  Heme:  No petechiae, ecchymosis, easy bruisability  Skin:  No rash    PHYSICAL EXAM:   Vital Signs:  Vital Signs Last 24 Hrs  T(C): 37.1 (04 Jul 2022 04:07), Max: 37.1 (04 Jul 2022 04:07)  T(F): 98.8 (04 Jul 2022 04:07), Max: 98.8 (04 Jul 2022 04:07)  HR: 100 (04 Jul 2022 04:07) (93 - 113)  BP: 148/88 (04 Jul 2022 04:07) (129/71 - 148/88)  BP(mean): --  RR: 17 (04 Jul 2022 04:07) (16 - 18)  SpO2: 98% (04 Jul 2022 04:07) (98% - 100%)  Daily     Daily     GENERAL: no acute distress  NEURO: alert  HEENT: anicteric sclera, no conjunctival pallor appreciated  CHEST: no respiratory distress, no accessory muscle use  CARDIAC: regular rate, rhythm  ABDOMEN: soft, non-tender, non-distended, no rebound or guarding  EXTREMITIES: warm, well perfused, no edema  SKIN: no lesions noted    LABS: reviewed                        13.1   11.29 )-----------( 366      ( 03 Jul 2022 19:25 )             39.6     07-03    139  |  102  |  8   ----------------------------<  162<H>  3.2<L>   |  18<L>  |  0.67    Ca    9.5      03 Jul 2022 19:25  Phos  1.2     07-03  Mg     1.60     07-03    TPro  8.5<H>  /  Alb  4.5  /  TBili  4.3<H>  /  DBili  x   /  AST  160<H>  /  ALT  310<H>  /  AlkPhos  111  07-03    LIVER FUNCTIONS - ( 03 Jul 2022 19:25 )  Alb: 4.5 g/dL / Pro: 8.5 g/dL / ALK PHOS: 111 U/L / ALT: 310 U/L / AST: 160 U/L / GGT: x               Diagnostic Studies: see sunrise for full report         Chief Complaint:  Patient is a 31y old  Female who presents with a chief complaint of Acute cholecystitis, gallstone pancreatitis and choledocholithiasis (04 Jul 2022 05:08)      HPI:TANJA HORN is a 31y Female with no PMH presenting for 3 days of severe epigastric pain. Patient endorses sudden onset RUQ and epigastric pain that began on Friday worse with po c/b nausea and NBNB vomiting, absent on Saturday so she had some alcoholic beverages w/o pain, but then the pain, n/v returned on Sunday and became so severe so she went to the ED. She reports a similary episode in 2013 for which she saw doctors but says she did not have imaging and was discharged with improved sx. She denies f/c at home, diarrhea/constipation. She has been afebrile, but on admission she was tachycardic, with WBC 11, INR 1.3, , Bili 4.3, , ALT 31, all of which have downtrended today. Lipase was 3000+, TG 40. US revealed cholelithiasis with GB wall thickening and pericholecystic fluid, normal caliber bile ducts and da normal liver, and a 5 cm echogenic mass like structure in the RUQ with vascularity between the R hepatic lobe and the R kidney that was suggested to the reactive SB (mildly dilated proximal SB loops) on CT. CT also reported gallstones w/i CBD, gallstones with GB wall thickening and pericholecystic fluid and an edematous pancreas. Denies h/o EGD/colon. Denies fhx of cancer but says her mother had to get radiation of lymph nodes recently, but does not recall a diagnosis of cancer. Denies heavy alcohol use, smoking or illicit drug use.       PMHX/PSHX:  No pertinent past medical history    No significant past surgical history      Allergies:  No Known Allergies      Home Medications: reviewed  Hospital Medications:  acetaminophen   IVPB .. 1000 milliGRAM(s) IV Intermittent every 6 hours  enoxaparin Injectable 40 milliGRAM(s) SubCutaneous every 24 hours  lactated ringers. 1000 milliLiter(s) IV Continuous <Continuous>  ondansetron Injectable 4 milliGRAM(s) IV Push every 8 hours PRN  piperacillin/tazobactam IVPB.. 3.375 Gram(s) IV Intermittent every 8 hours      Social History:   Tob: Denies  EtOH: socially  Illicit Drugs: Denies    Family history:    Mother had radiation to LN, unclear diagnosis    ROS:   Complete and normal except as mentioned above.    PHYSICAL EXAM:   Vital Signs:  Vital Signs Last 24 Hrs  T(C): 37.1 (04 Jul 2022 04:07), Max: 37.1 (04 Jul 2022 04:07)  T(F): 98.8 (04 Jul 2022 04:07), Max: 98.8 (04 Jul 2022 04:07)  HR: 100 (04 Jul 2022 04:07) (93 - 113)  BP: 148/88 (04 Jul 2022 04:07) (129/71 - 148/88)  BP(mean): --  RR: 17 (04 Jul 2022 04:07) (16 - 18)  SpO2: 98% (04 Jul 2022 04:07) (98% - 100%)  Daily     Daily     GENERAL: mild distress due to pain  NEURO: alert  HEENT: anicteric sclera, no conjunctival pallor appreciated  CHEST: no respiratory distress, no accessory muscle use  CARDIAC: regular rate, rhythm  ABDOMEN: soft, r sided tender, non-distended, no rebound or guarding; vomiting bilious liquid  EXTREMITIES: warm, well perfused, no edema  SKIN: no lesions noted    LABS: reviewed                        13.1   11.29 )-----------( 366      ( 03 Jul 2022 19:25 )             39.6     07-03    139  |  102  |  8   ----------------------------<  162<H>  3.2<L>   |  18<L>  |  0.67    Ca    9.5      03 Jul 2022 19:25  Phos  1.2     07-03  Mg     1.60     07-03    TPro  8.5<H>  /  Alb  4.5  /  TBili  4.3<H>  /  DBili  x   /  AST  160<H>  /  ALT  310<H>  /  AlkPhos  111  07-03    LIVER FUNCTIONS - ( 03 Jul 2022 19:25 )  Alb: 4.5 g/dL / Pro: 8.5 g/dL / ALK PHOS: 111 U/L / ALT: 310 U/L / AST: 160 U/L / GGT: x               Diagnostic Studies: see sunrise for full report

## 2022-07-04 NOTE — CONSULT NOTE ADULT - ATTENDING COMMENTS
31F admitted with abdominal pain, n/v.   Labs significant for elevated lipase and liver enzymes (although downtrending bilirubin).   Imaging concerning for pancreatitis, acute cholecystitis, choledocholithiasis.     MRCP pending  Please make npo at [place] for possible ercp tomorrow  trend cbc, cmp  f/u bcx  continue iv abx   please page gi on call if any fevers, ams, signs/symptoms of cholangitis   GI to follow, please call with questions.

## 2022-07-04 NOTE — PROGRESS NOTE ADULT - SUBJECTIVE AND OBJECTIVE BOX
Overnight events: None    SUBJECTIVE: Pt seen and examined at bedside.       OBJECTIVE:  Vital Signs Last 24 Hrs  T(C): 37.1 (04 Jul 2022 04:07), Max: 37.1 (04 Jul 2022 04:07)  T(F): 98.8 (04 Jul 2022 04:07), Max: 98.8 (04 Jul 2022 04:07)  HR: 100 (04 Jul 2022 04:07) (93 - 113)  BP: 148/88 (04 Jul 2022 04:07) (129/71 - 148/88)  BP(mean): --  RR: 17 (04 Jul 2022 04:07) (16 - 18)  SpO2: 98% (04 Jul 2022 04:07) (98% - 100%)        Physical Examination:  Gen: No acute distress.  Abd: Soft, nondistended, mild epigastric and periumbilical tenderness, no rebound, no guarding.  Ext: Warm and well-perfused    LABS:                        13.1   11.29 )-----------( 366      ( 03 Jul 2022 19:25 )             39.6       07-03    139  |  102  |  8   ----------------------------<  162<H>  3.2<L>   |  18<L>  |  0.67    Ca    9.5      03 Jul 2022 19:25  Phos  1.2     07-03  Mg     1.60     07-03    TPro  8.5<H>  /  Alb  4.5  /  TBili  4.3<H>  /  DBili  x   /  AST  160<H>  /  ALT  310<H>  /  AlkPhos  111  07-03         Overnight events: Admission to [facility]    SUBJECTIVE: Pt seen and examined at bedside.       OBJECTIVE:  Vital Signs Last 24 Hrs  T(C): 36.7 (04 Jul 2022 18:37), Max: 37.1 (04 Jul 2022 04:07)  T(F): 98 (04 Jul 2022 18:37), Max: 98.8 (04 Jul 2022 04:07)  HR: 85 (04 Jul 2022 18:37) (68 - 100)  BP: 146/80 (04 Jul 2022 18:37) (130/70 - 148/88)  BP(mean): --  RR: 17 (04 Jul 2022 18:37) (16 - 18)  SpO2: 100% (04 Jul 2022 18:37) (98% - 100%)      I&O's Detail    04 Jul 2022 07:01  -  04 Jul 2022 22:01  --------------------------------------------------------      IN:  Total IN: 0 mL    OUT:    Voided (mL): 750 mL  Total OUT: 750 mL    Total NET: -750 mL            Physical Examination:  Gen: No acute distress.  Abd: Soft, nondistended, mild epigastric and periumbilical tenderness, no rebound, no guarding.  Ext: Warm and well-perfused    LABS:                 07-04    140  |  105  |  9   ----------------------------<  108<H>  4.1   |  20<L>  |  0.74    Ca    8.7      04 Jul 2022 07:30  Phos  3.7     07-04  Mg     1.80     07-04    TPro  7.9  /  Alb  4.2  /  TBili  1.7<H>  /  DBili  1.0<H>  /  AST  104<H>  /  ALT  252<H>  /  AlkPhos  106  07-04                          12.6   10.72 )-----------( 348      ( 04 Jul 2022 07:30 )             38.6

## 2022-07-04 NOTE — H&P ADULT - ASSESSMENT
30yo F with no PMH presenting for 3 days of severe epigastric pain found to have acute cholecystitis, gallstone pancreatitis and possible choledocholithiasis. Currently hemodynamically stable.    PLAN:  - Admit to B team surgery under Dr. Gabriela Jacinto, floor bed  - NPO/IVF  - Zosyn  - MRCP to evaluate for choledocho given elevated tbili  - GI consulted via email  - Trend LFTs and tbili  - Monitor abdominal exam    D/w Dr. Orville Clayton, PGY3  B Team Surgery   [id number]

## 2022-07-05 LAB
ALBUMIN SERPL ELPH-MCNC: 3.6 G/DL — SIGNIFICANT CHANGE UP (ref 3.3–5)
ALP SERPL-CCNC: 85 U/L — SIGNIFICANT CHANGE UP (ref 40–120)
ALT FLD-CCNC: 157 U/L — HIGH (ref 4–33)
ANION GAP SERPL CALC-SCNC: 13 MMOL/L — SIGNIFICANT CHANGE UP (ref 7–14)
AST SERPL-CCNC: 41 U/L — HIGH (ref 4–32)
BILIRUB SERPL-MCNC: 1.3 MG/DL — HIGH (ref 0.2–1.2)
BUN SERPL-MCNC: 8 MG/DL — SIGNIFICANT CHANGE UP (ref 7–23)
CALCIUM SERPL-MCNC: 8.3 MG/DL — LOW (ref 8.4–10.5)
CHLORIDE SERPL-SCNC: 100 MMOL/L — SIGNIFICANT CHANGE UP (ref 98–107)
CO2 SERPL-SCNC: 22 MMOL/L — SIGNIFICANT CHANGE UP (ref 22–31)
CREAT SERPL-MCNC: 0.62 MG/DL — SIGNIFICANT CHANGE UP (ref 0.5–1.3)
CULTURE RESULTS: SIGNIFICANT CHANGE UP
EGFR: 122 ML/MIN/1.73M2 — SIGNIFICANT CHANGE UP
GLUCOSE SERPL-MCNC: 91 MG/DL — SIGNIFICANT CHANGE UP (ref 70–99)
HCT VFR BLD CALC: 37 % — SIGNIFICANT CHANGE UP (ref 34.5–45)
HGB BLD-MCNC: 11.9 G/DL — SIGNIFICANT CHANGE UP (ref 11.5–15.5)
MAGNESIUM SERPL-MCNC: 1.8 MG/DL — SIGNIFICANT CHANGE UP (ref 1.6–2.6)
MCHC RBC-ENTMCNC: 28.6 PG — SIGNIFICANT CHANGE UP (ref 27–34)
MCHC RBC-ENTMCNC: 32.2 GM/DL — SIGNIFICANT CHANGE UP (ref 32–36)
MCV RBC AUTO: 88.9 FL — SIGNIFICANT CHANGE UP (ref 80–100)
NRBC # BLD: 0 /100 WBCS — SIGNIFICANT CHANGE UP
NRBC # FLD: 0 K/UL — SIGNIFICANT CHANGE UP
PHOSPHATE SERPL-MCNC: 1.9 MG/DL — LOW (ref 2.5–4.5)
PLATELET # BLD AUTO: 296 K/UL — SIGNIFICANT CHANGE UP (ref 150–400)
POTASSIUM SERPL-MCNC: 3.6 MMOL/L — SIGNIFICANT CHANGE UP (ref 3.5–5.3)
POTASSIUM SERPL-SCNC: 3.6 MMOL/L — SIGNIFICANT CHANGE UP (ref 3.5–5.3)
PROT SERPL-MCNC: 6.5 G/DL — SIGNIFICANT CHANGE UP (ref 6–8.3)
RBC # BLD: 4.16 M/UL — SIGNIFICANT CHANGE UP (ref 3.8–5.2)
RBC # FLD: 12.7 % — SIGNIFICANT CHANGE UP (ref 10.3–14.5)
SODIUM SERPL-SCNC: 135 MMOL/L — SIGNIFICANT CHANGE UP (ref 135–145)
SPECIMEN SOURCE: SIGNIFICANT CHANGE UP
WBC # BLD: 14.36 K/UL — HIGH (ref 3.8–10.5)
WBC # FLD AUTO: 14.36 K/UL — HIGH (ref 3.8–10.5)

## 2022-07-05 PROCEDURE — 99232 SBSQ HOSP IP/OBS MODERATE 35: CPT | Mod: 57,GC

## 2022-07-05 PROCEDURE — 99232 SBSQ HOSP IP/OBS MODERATE 35: CPT | Mod: GC

## 2022-07-05 RX ORDER — MAGNESIUM SULFATE 500 MG/ML
1 VIAL (ML) INJECTION ONCE
Refills: 0 | Status: COMPLETED | OUTPATIENT
Start: 2022-07-05 | End: 2022-07-05

## 2022-07-05 RX ORDER — SODIUM CHLORIDE 9 MG/ML
1000 INJECTION, SOLUTION INTRAVENOUS ONCE
Refills: 0 | Status: COMPLETED | OUTPATIENT
Start: 2022-07-05 | End: 2022-07-05

## 2022-07-05 RX ORDER — ACETAMINOPHEN 500 MG
1000 TABLET ORAL EVERY 6 HOURS
Refills: 0 | Status: COMPLETED | OUTPATIENT
Start: 2022-07-06 | End: 2022-07-07

## 2022-07-05 RX ORDER — POTASSIUM CHLORIDE 20 MEQ
40 PACKET (EA) ORAL ONCE
Refills: 0 | Status: COMPLETED | OUTPATIENT
Start: 2022-07-05 | End: 2022-07-05

## 2022-07-05 RX ORDER — POTASSIUM PHOSPHATE, MONOBASIC POTASSIUM PHOSPHATE, DIBASIC 236; 224 MG/ML; MG/ML
15 INJECTION, SOLUTION INTRAVENOUS ONCE
Refills: 0 | Status: COMPLETED | OUTPATIENT
Start: 2022-07-05 | End: 2022-07-05

## 2022-07-05 RX ORDER — ACETAMINOPHEN 500 MG
1000 TABLET ORAL EVERY 6 HOURS
Refills: 0 | Status: COMPLETED | OUTPATIENT
Start: 2022-07-05 | End: 2022-07-06

## 2022-07-05 RX ADMIN — Medication 400 MILLIGRAM(S): at 04:21

## 2022-07-05 RX ADMIN — HYDROMORPHONE HYDROCHLORIDE 0.5 MILLIGRAM(S): 2 INJECTION INTRAMUSCULAR; INTRAVENOUS; SUBCUTANEOUS at 02:14

## 2022-07-05 RX ADMIN — HYDROMORPHONE HYDROCHLORIDE 0.5 MILLIGRAM(S): 2 INJECTION INTRAMUSCULAR; INTRAVENOUS; SUBCUTANEOUS at 07:12

## 2022-07-05 RX ADMIN — POTASSIUM PHOSPHATE, MONOBASIC POTASSIUM PHOSPHATE, DIBASIC 62.5 MILLIMOLE(S): 236; 224 INJECTION, SOLUTION INTRAVENOUS at 13:14

## 2022-07-05 RX ADMIN — HYDROMORPHONE HYDROCHLORIDE 0.5 MILLIGRAM(S): 2 INJECTION INTRAMUSCULAR; INTRAVENOUS; SUBCUTANEOUS at 02:29

## 2022-07-05 RX ADMIN — Medication 1000 MILLIGRAM(S): at 04:30

## 2022-07-05 RX ADMIN — Medication 400 MILLIGRAM(S): at 10:47

## 2022-07-05 RX ADMIN — PIPERACILLIN AND TAZOBACTAM 25 GRAM(S): 4; .5 INJECTION, POWDER, LYOPHILIZED, FOR SOLUTION INTRAVENOUS at 07:13

## 2022-07-05 RX ADMIN — HYDROMORPHONE HYDROCHLORIDE 0.5 MILLIGRAM(S): 2 INJECTION INTRAMUSCULAR; INTRAVENOUS; SUBCUTANEOUS at 07:22

## 2022-07-05 RX ADMIN — HYDROMORPHONE HYDROCHLORIDE 0.5 MILLIGRAM(S): 2 INJECTION INTRAMUSCULAR; INTRAVENOUS; SUBCUTANEOUS at 19:49

## 2022-07-05 RX ADMIN — SODIUM CHLORIDE 333.33 MILLILITER(S): 9 INJECTION, SOLUTION INTRAVENOUS at 16:44

## 2022-07-05 RX ADMIN — Medication 40 MILLIEQUIVALENT(S): at 13:15

## 2022-07-05 RX ADMIN — HYDROMORPHONE HYDROCHLORIDE 0.5 MILLIGRAM(S): 2 INJECTION INTRAMUSCULAR; INTRAVENOUS; SUBCUTANEOUS at 13:30

## 2022-07-05 RX ADMIN — Medication 100 GRAM(S): at 13:15

## 2022-07-05 RX ADMIN — HYDROMORPHONE HYDROCHLORIDE 0.5 MILLIGRAM(S): 2 INJECTION INTRAMUSCULAR; INTRAVENOUS; SUBCUTANEOUS at 13:15

## 2022-07-05 RX ADMIN — Medication 400 MILLIGRAM(S): at 16:43

## 2022-07-05 RX ADMIN — HYDROMORPHONE HYDROCHLORIDE 0.5 MILLIGRAM(S): 2 INJECTION INTRAMUSCULAR; INTRAVENOUS; SUBCUTANEOUS at 19:34

## 2022-07-05 RX ADMIN — PIPERACILLIN AND TAZOBACTAM 25 GRAM(S): 4; .5 INJECTION, POWDER, LYOPHILIZED, FOR SOLUTION INTRAVENOUS at 16:43

## 2022-07-05 RX ADMIN — Medication 400 MILLIGRAM(S): at 22:37

## 2022-07-05 NOTE — PROGRESS NOTE ADULT - ASSESSMENT
31y Female with no PMH presenting for 3 days of severe epigastric pain. Patient endorses sudden onset RUQ and epigastric pain that began on Friday worse with po c/b nausea and NBNB vomiting found to have gallstone pancreatitis, choledocho, and acute cholecystitis.    # Gallstone pancreatitis  # Choledocholithiasis - improving bili  # Ac Cholecystitis  -  CT gallstones w/i CBD, gallstones with GB wall thickening and pericholecystic fluid and an edematous pancreas.     Recommendations:  - f/u MRCP  - clear liquids if able to tolerate  - c/w Abx  - Ensure adequate hydration, pain control  - Rest of care per primary    Note not finalized until signed by attending.    Nia Ibarra PGY-6  Gastroenterology/Hepatology Fellow  Pager #[phone] (SY) or [phone] (NS)  Available on [employer] Teams.  Please contact on-call GI fellow via answering service ([phone]) after 5pm and before 8am, and on weekends.

## 2022-07-05 NOTE — PROGRESS NOTE ADULT - ASSESSMENT
32yo F with no PMH presenting for 3 days of severe epigastric pain found to have acute cholecystitis, gallstone pancreatitis and possible choledocholithiasis. Currently hemodynamically stable.    PLAN:  - NPO/IVF  - Zosyn  - MRCP to evaluate for choledocho given elevated tbili  - GI consulted via email  - Trend LFTs and tbili  - Monitor abdominal exam      B Team Surgery   [id number]

## 2022-07-05 NOTE — PROGRESS NOTE ADULT - SUBJECTIVE AND OBJECTIVE BOX
Chief Complaint:  Patient is a 31y old  Female who presents with a chief complaint of Acute cholecystitis, gallstone pancreatitis and choledocholithiasis (2022 01:59)      Interval Events: no acute events overnight  - +abd pain      Hospital Medications:  acetaminophen   IVPB .. 1000 milliGRAM(s) IV Intermittent every 6 hours  HYDROmorphone  Injectable 0.5 milliGRAM(s) IV Push every 4 hours PRN  HYDROmorphone  Injectable 0.25 milliGRAM(s) IV Push every 4 hours PRN  lactated ringers. 1000 milliLiter(s) IV Continuous <Continuous>  metoclopramide Injectable 10 milliGRAM(s) IV Push every 6 hours PRN  ondansetron Injectable 4 milliGRAM(s) IV Push every 8 hours PRN  piperacillin/tazobactam IVPB.. 3.375 Gram(s) IV Intermittent every 8 hours      PMHX/PSHX:  No pertinent past medical history    No significant past surgical history            ROS:     General:  No weight loss, fevers, chills, night sweats, fatigue   Eyes:  No vision changes  ENT:  No sore throat, pain, runny nose  CV:  No chest pain, palpitations, dizziness   Resp:  No SOB, cough, wheezing  GI:  See HPI  :  No burning with urination, hematuria  Muscle:  No pain, weakness  Neuro:  No weakness/tingling, memory problems  Psych:  No fatigue, insomnia, mood problems, depression  Heme:  No easy bruisability  Skin:  No rash, edema      PHYSICAL EXAM:     GENERAL:  Well developed, no distress  HEENT:  NC/AT,  conjunctivae clear, sclera anicteric  CHEST:  Full & symmetric excursion, no increased effort w/ respirations  HEART:  Regular rhythm & rate  ABDOMEN:  Soft, non-tender, non-distended  EXTREMITIES:  no LE  edema  SKIN:  No rash/erythema/ecchymoses/petechiae/wounds/jaundice  NEURO:  Alert, oriented    Vital Signs:  Vital Signs Last 24 Hrs  T(C): 37.1 (2022 09:22), Max: 37.1 (2022 09:57)  T(F): 98.8 (2022 09:22), Max: 98.8 (2022 09:22)  HR: 71 (2022 09:22) (68 - 95)  BP: 135/65 (2022 09:22) (130/70 - 146/80)  BP(mean): --  RR: 17 (2022 09:22) (17 - 18)  SpO2: 96% (2022 09:22) (96% - 100%)  Daily Height in cm: 177.8 (2022 18:37)    Daily Weight in k (2022 01:55)    LABS:                        11.9   14.36 )-----------( 296      ( 2022 06:50 )             37.0     07-05    135  |  100  |  8   ----------------------------<  91  3.6   |  22  |  0.62    Ca    8.3<L>      2022 06:50  Phos  1.9     07-05  Mg     1.80     07-05    TPro  6.5  /  Alb  3.6  /  TBili  1.3<H>  /  DBili  x   /  AST  41<H>  /  ALT  157<H>  /  AlkPhos  85  07-05    LIVER FUNCTIONS - ( 2022 06:50 )  Alb: 3.6 g/dL / Pro: 6.5 g/dL / ALK PHOS: 85 U/L / ALT: 157 U/L / AST: 41 U/L / GGT: x           PT/INR - ( 2022 01:25 )   PT: 15.6 sec;   INR: 1.34 ratio         PTT - ( 2022 01:25 )  PTT:28.3 sec  Urinalysis Basic - ( 2022 20:40 )    Color: Lisa / Appearance: Slightly Turbid / S.017 / pH: x  Gluc: x / Ketone: Large  / Bili: Small / Urobili: 3 mg/dL   Blood: x / Protein: Trace / Nitrite: Negative   Leuk Esterase: Negative / RBC: 1 /HPF / WBC 1 /HPF   Sq Epi: x / Non Sq Epi: 1 /HPF / Bacteria: Negative          Imaging:  < from: CT Abdomen and Pelvis w/ IV Cont (22 @ 02:15) >  FINDINGS:  LOWER CHEST: Bibasilar subsegmental atelectasis.    LIVER: Within normal limits.  BILE DUCTS: Gallstones visualized within the CBD on coronal imaging   (601:40)  GALLBLADDER: Multiple gallstones with gallbladder wall thickening and   pericholecystic fluid, concerning for acute cholecystitis.  SPLEEN: Within normal limits.  PANCREAS: Edematous pancreas.  ADRENALS: Within normal limits.  KIDNEYS/URETERS: No hydronephrosis or renal calculi.    BLADDER: Within normal limits.  REPRODUCTIVE ORGANS: Uterus and adnexa within normal limits. IUD present.    BOWEL: Mildly dilated proximal small bowel loops, likely reactive. No   bowel obstruction. Appendix is normal.  PERITONEUM: Small volume abdominopelvic ascites.  VESSELS: Within normal limits.  RETROPERITONEUM/LYMPH NODES: No lymphadenopathy.  ABDOMINAL WALL: Within normal limits.  BONES: Within normal limits.    IMPRESSION:  Gallstone pancreatitis.    Small volume abdominopelvic ascites.    Indeterminate structure seen on prior ultrasound likely corresponding to   bowel.    Findings were discussed by Dr. Emmanuel with MD Jorge A on 2022 3:23   AM with read back confirmation.    --- End of Report ---    < end of copied text >

## 2022-07-05 NOTE — PROGRESS NOTE ADULT - SUBJECTIVE AND OBJECTIVE BOX
Overnight events: None    SUBJECTIVE: Pt seen and examined at bedside.       OBJECTIVE:  Vital Signs Last 24 Hrs  T(C): 36.7 (04 Jul 2022 23:13), Max: 37.1 (04 Jul 2022 04:07)  T(F): 98.1 (04 Jul 2022 23:13), Max: 98.8 (04 Jul 2022 04:07)  HR: 95 (04 Jul 2022 23:13) (68 - 100)  BP: 140/80 (04 Jul 2022 23:13) (130/70 - 148/88)  BP(mean): --  RR: 17 (04 Jul 2022 23:13) (17 - 18)  SpO2: 100% (04 Jul 2022 23:13) (98% - 100%)      07-04-22 @ 07:01  -  07-05-22 @ 01:59  --------------------------------------------------------  IN: 0 mL / OUT: 750 mL / NET: -750 mL        Physical Examination:  Gen: No acute distress.  Abd: Soft, nondistended, mild epigastric and periumbilical tenderness, no rebound, no guarding.  Ext: Warm and well-perfused        LABS:                        12.6   10.72 )-----------( 348      ( 04 Jul 2022 07:30 )             38.6       07-04    140  |  105  |  9   ----------------------------<  108<H>  4.1   |  20<L>  |  0.74    Ca    8.7      04 Jul 2022 07:30  Phos  3.7     07-04  Mg     1.80     07-04    TPro  7.9  /  Alb  4.2  /  TBili  1.7<H>  /  DBili  1.0<H>  /  AST  104<H>  /  ALT  252<H>  /  AlkPhos  106  07-04

## 2022-07-06 LAB
ALBUMIN SERPL ELPH-MCNC: 3.2 G/DL — LOW (ref 3.3–5)
ALP SERPL-CCNC: 84 U/L — SIGNIFICANT CHANGE UP (ref 40–120)
ALT FLD-CCNC: 108 U/L — HIGH (ref 4–33)
ANION GAP SERPL CALC-SCNC: 12 MMOL/L — SIGNIFICANT CHANGE UP (ref 7–14)
AST SERPL-CCNC: 23 U/L — SIGNIFICANT CHANGE UP (ref 4–32)
BILIRUB DIRECT SERPL-MCNC: 0.4 MG/DL — HIGH (ref 0–0.3)
BILIRUB INDIRECT FLD-MCNC: 0.6 MG/DL — SIGNIFICANT CHANGE UP (ref 0–1)
BILIRUB SERPL-MCNC: 1 MG/DL — SIGNIFICANT CHANGE UP (ref 0.2–1.2)
BUN SERPL-MCNC: 7 MG/DL — SIGNIFICANT CHANGE UP (ref 7–23)
CALCIUM SERPL-MCNC: 8.1 MG/DL — LOW (ref 8.4–10.5)
CHLORIDE SERPL-SCNC: 100 MMOL/L — SIGNIFICANT CHANGE UP (ref 98–107)
CO2 SERPL-SCNC: 23 MMOL/L — SIGNIFICANT CHANGE UP (ref 22–31)
CREAT SERPL-MCNC: 0.6 MG/DL — SIGNIFICANT CHANGE UP (ref 0.5–1.3)
EGFR: 123 ML/MIN/1.73M2 — SIGNIFICANT CHANGE UP
GLUCOSE BLDC GLUCOMTR-MCNC: 90 MG/DL — SIGNIFICANT CHANGE UP (ref 70–99)
GLUCOSE SERPL-MCNC: 82 MG/DL — SIGNIFICANT CHANGE UP (ref 70–99)
HCT VFR BLD CALC: 34.4 % — LOW (ref 34.5–45)
HGB BLD-MCNC: 11.4 G/DL — LOW (ref 11.5–15.5)
MAGNESIUM SERPL-MCNC: 1.9 MG/DL — SIGNIFICANT CHANGE UP (ref 1.6–2.6)
MCHC RBC-ENTMCNC: 29.2 PG — SIGNIFICANT CHANGE UP (ref 27–34)
MCHC RBC-ENTMCNC: 33.1 GM/DL — SIGNIFICANT CHANGE UP (ref 32–36)
MCV RBC AUTO: 88 FL — SIGNIFICANT CHANGE UP (ref 80–100)
NRBC # BLD: 0 /100 WBCS — SIGNIFICANT CHANGE UP
NRBC # FLD: 0 K/UL — SIGNIFICANT CHANGE UP
PHOSPHATE SERPL-MCNC: 2.2 MG/DL — LOW (ref 2.5–4.5)
PLATELET # BLD AUTO: 284 K/UL — SIGNIFICANT CHANGE UP (ref 150–400)
POTASSIUM SERPL-MCNC: 3.4 MMOL/L — LOW (ref 3.5–5.3)
POTASSIUM SERPL-SCNC: 3.4 MMOL/L — LOW (ref 3.5–5.3)
PROT SERPL-MCNC: 6.5 G/DL — SIGNIFICANT CHANGE UP (ref 6–8.3)
RBC # BLD: 3.91 M/UL — SIGNIFICANT CHANGE UP (ref 3.8–5.2)
RBC # FLD: 12.6 % — SIGNIFICANT CHANGE UP (ref 10.3–14.5)
SARS-COV-2 RNA SPEC QL NAA+PROBE: SIGNIFICANT CHANGE UP
SODIUM SERPL-SCNC: 135 MMOL/L — SIGNIFICANT CHANGE UP (ref 135–145)
WBC # BLD: 15.9 K/UL — HIGH (ref 3.8–10.5)
WBC # FLD AUTO: 15.9 K/UL — HIGH (ref 3.8–10.5)

## 2022-07-06 PROCEDURE — 99232 SBSQ HOSP IP/OBS MODERATE 35: CPT | Mod: GC

## 2022-07-06 PROCEDURE — 99232 SBSQ HOSP IP/OBS MODERATE 35: CPT | Mod: 57,GC

## 2022-07-06 RX ORDER — POTASSIUM PHOSPHATE, MONOBASIC POTASSIUM PHOSPHATE, DIBASIC 236; 224 MG/ML; MG/ML
30 INJECTION, SOLUTION INTRAVENOUS ONCE
Refills: 0 | Status: COMPLETED | OUTPATIENT
Start: 2022-07-06 | End: 2022-07-06

## 2022-07-06 RX ORDER — ENOXAPARIN SODIUM 100 MG/ML
40 INJECTION SUBCUTANEOUS EVERY 24 HOURS
Refills: 0 | Status: DISCONTINUED | OUTPATIENT
Start: 2022-07-06 | End: 2022-07-07

## 2022-07-06 RX ADMIN — POTASSIUM PHOSPHATE, MONOBASIC POTASSIUM PHOSPHATE, DIBASIC 83.33 MILLIMOLE(S): 236; 224 INJECTION, SOLUTION INTRAVENOUS at 09:54

## 2022-07-06 RX ADMIN — Medication 400 MILLIGRAM(S): at 04:08

## 2022-07-06 RX ADMIN — Medication 400 MILLIGRAM(S): at 12:26

## 2022-07-06 RX ADMIN — ONDANSETRON 4 MILLIGRAM(S): 8 TABLET, FILM COATED ORAL at 16:19

## 2022-07-06 RX ADMIN — ONDANSETRON 4 MILLIGRAM(S): 8 TABLET, FILM COATED ORAL at 23:36

## 2022-07-06 RX ADMIN — PIPERACILLIN AND TAZOBACTAM 25 GRAM(S): 4; .5 INJECTION, POWDER, LYOPHILIZED, FOR SOLUTION INTRAVENOUS at 00:59

## 2022-07-06 RX ADMIN — ENOXAPARIN SODIUM 40 MILLIGRAM(S): 100 INJECTION SUBCUTANEOUS at 12:26

## 2022-07-06 RX ADMIN — PIPERACILLIN AND TAZOBACTAM 25 GRAM(S): 4; .5 INJECTION, POWDER, LYOPHILIZED, FOR SOLUTION INTRAVENOUS at 23:37

## 2022-07-06 RX ADMIN — HYDROMORPHONE HYDROCHLORIDE 0.5 MILLIGRAM(S): 2 INJECTION INTRAMUSCULAR; INTRAVENOUS; SUBCUTANEOUS at 17:05

## 2022-07-06 RX ADMIN — HYDROMORPHONE HYDROCHLORIDE 0.5 MILLIGRAM(S): 2 INJECTION INTRAMUSCULAR; INTRAVENOUS; SUBCUTANEOUS at 18:05

## 2022-07-06 RX ADMIN — Medication 400 MILLIGRAM(S): at 21:38

## 2022-07-06 RX ADMIN — SODIUM CHLORIDE 75 MILLILITER(S): 9 INJECTION, SOLUTION INTRAVENOUS at 04:08

## 2022-07-06 RX ADMIN — PIPERACILLIN AND TAZOBACTAM 25 GRAM(S): 4; .5 INJECTION, POWDER, LYOPHILIZED, FOR SOLUTION INTRAVENOUS at 17:06

## 2022-07-06 RX ADMIN — PIPERACILLIN AND TAZOBACTAM 25 GRAM(S): 4; .5 INJECTION, POWDER, LYOPHILIZED, FOR SOLUTION INTRAVENOUS at 09:19

## 2022-07-06 NOTE — CHART NOTE - NSCHARTNOTEFT_GEN_A_CORE
Surgery Pre-Op Note      Procedure:   Surgeon: Dimas Vargas    TO-DO List:  [x] Type & Screen ordered  [x] CBC ordered  [x] BMP ordered  [x] PT/PTT/INR  [x] Chest X-ray done on 7/4  [ ] EKG on 11/19  [x] NPO/IVF  [x] Consent  [x] Added on to OR Schedule  [x] AC hold      Team B surgery [id number] Surgery Pre-Op Note      Procedure:   Surgeon: Dimas Vargas    TO-DO List:  [x] Type & Screen ordered  [x] CBC ordered  [x] BMP ordered  [x] PT/PTT/INR  [x] Chest X-ray done on 7/4  [x] NPO/IVF  [x] Consent  [] Added on to OR Schedule  [x] AC hold      Team B surgery [id number] Surgery Pre-Op Note      Procedure:   Surgeon: Dimas Vargas    TO-DO List:  [x] Type & Screen ordered  [x] CBC ordered  [x] BMP ordered  [x] PT/PTT/INR  [x] Chest X-ray done on 7/4  [x] NPO/IVF  [x] Consent  [x] Added on to OR Schedule  [x] AC hold      Team B surgery [id number]

## 2022-07-06 NOTE — CHART NOTE - NSCHARTNOTEFT_GEN_A_CORE
Surgery Pre-Op Note      Procedure:   Surgeon: Dr. LUTZ List:  [ ] Type & Screen ordered  [ ] CBC ordered  [ ] BMP ordered  [ ] PT/PTT/INR  [ ] Chest X-ray done on  [ ] EKG on   [ ] NPO/IVF  [ ] Consent  [ ] Medical Clearance  [ ] Cardiology clearance  [ ] Added on to OR Schedule  [ ] COVID negative  [ ] Pregnancy test  [ ] AC hold      team B surgery [id number]

## 2022-07-06 NOTE — PROGRESS NOTE ADULT - SUBJECTIVE AND OBJECTIVE BOX
POD #    Overnight events:     SUBJECTIVE: Pt seen and examined at bedside.       OBJECTIVE:  Vital Signs Last 24 Hrs  T(C): 37.4 (05 Jul 2022 22:03), Max: 37.4 (05 Jul 2022 22:03)  T(F): 99.4 (05 Jul 2022 22:03), Max: 99.4 (05 Jul 2022 22:03)  HR: 96 (05 Jul 2022 22:03) (71 - 96)  BP: 136/76 (05 Jul 2022 22:03) (135/65 - 145/75)  BP(mean): --  RR: 17 (05 Jul 2022 22:03) (17 - 18)  SpO2: 100% (05 Jul 2022 22:03) (96% - 100%)      07-04-22 @ 07:01  -  07-05-22 @ 07:00  --------------------------------------------------------  IN: 0 mL / OUT: 750 mL / NET: -750 mL    07-05-22 @ 07:01  -  07-06-22 @ 01:22  --------------------------------------------------------  IN: 0 mL / OUT: 250 mL / NET: -250 mL        Physical Examination:  Gen: No acute distress.  Abd: Soft, nondistended, mild epigastric and periumbilical tenderness, no rebound, no guarding.  Ext: Warm and well-perfused        LABS:                        11.9   14.36 )-----------( 296      ( 05 Jul 2022 06:50 )             37.0       07-05    135  |  100  |  8   ----------------------------<  91  3.6   |  22  |  0.62    Ca    8.3<L>      05 Jul 2022 06:50  Phos  1.9     07-05  Mg     1.80     07-05    TPro  6.5  /  Alb  3.6  /  TBili  1.3<H>  /  DBili  x   /  AST  41<H>  /  ALT  157<H>  /  AlkPhos  85  07-05       Surgery Daily Progress Note  =====================================================  Interval / Overnight Events: No acute events overnight.      HPI:  Patient is a 31 year old female with no PMHx who presented with severe epigastric pain x3 days. (04 Jul 2022 04:12)      PAST MEDICAL & SURGICAL HISTORY:  No pertinent past medical history  No significant past surgical history      ALLERGIES:  No Known Allergies    --------------------------------------------------------------------------------------    MEDICATIONS:    Neurologic Medications  acetaminophen   IVPB .. 1000 milliGRAM(s) IV Intermittent every 6 hours  HYDROmorphone  Injectable 0.5 milliGRAM(s) IV Push every 4 hours PRN Severe Pain (7 - 10)  HYDROmorphone  Injectable 0.25 milliGRAM(s) IV Push every 4 hours PRN Moderate Pain (4 - 6)  metoclopramide Injectable 10 milliGRAM(s) IV Push every 6 hours PRN nausea  ondansetron Injectable 4 milliGRAM(s) IV Push every 8 hours PRN Nausea and/or Vomiting    Gastrointestinal Medications  lactated ringers. 1000 milliLiter(s) IV Continuous <Continuous>    Hematologic/Oncologic Medications  enoxaparin Injectable 40 milliGRAM(s) SubCutaneous every 24 hours    Antimicrobial/Immunologic Medications  piperacillin/tazobactam IVPB.. 3.375 Gram(s) IV Intermittent every 8 hours    --------------------------------------------------------------------------------------    VITAL SIGNS:  ICU Vital Signs Last 24 Hrs  T(C): 36.4 (06 Jul 2022 09:47), Max: 37.6 (06 Jul 2022 02:00)  T(F): 97.6 (06 Jul 2022 09:47), Max: 99.6 (06 Jul 2022 02:00)  HR: 86 (06 Jul 2022 09:47) (86 - 96)  BP: 139/77 (06 Jul 2022 09:47) (119/53 - 143/74)  RR: 18 (06 Jul 2022 09:47) (17 - 18)  SpO2: 100% (06 Jul 2022 09:47) (98% - 100%)    --------------------------------------------------------------------------------------    INS AND OUTS:    05 Jul 2022 07:01  -  06 Jul 2022 07:00  --------------------------------------------------------  IN:  Total IN: 0 mL    OUT:    Voided (mL): 250 mL  Total OUT: 250 mL    Total NET: -250 mL    --------------------------------------------------------------------------------------    EXAM    NEUROLOGY  Exam: Normal, in no acute distress.    HEENT  Exam: Normocephalic, atraumatic.    RESPIRATORY  Exam: Normal expansion / effort.    CARDIOVASCULAR  Exam: S1, S2.  Regular rate and rhythm.    GI/NUTRITION  Exam: Abdomen soft, Non-tender, Non-distended.  Current Diet: Clear liquid diet    MUSCULOSKELETAL  Exam: All extremities moving spontaneously without limitations.      METABOLIC / FLUIDS / ELECTROLYTES  lactated ringers. 1000 milliLiter(s) IV Continuous <Continuous>      HEMATOLOGIC  [x] VTE Prophylaxis: enoxaparin Injectable 40 milliGRAM(s) SubCutaneous every 24 hours      INFECTIOUS DISEASE  Antimicrobials/Immunologic Medications:  piperacillin/tazobactam IVPB.. 3.375 Gram(s) IV Intermittent every 8 hours    -------------------------------------------------------------------------------------- Surgery Daily Progress Note  =====================================================  Interval / Overnight Events: VIKKI overnight. Patient has been NPO since 7/4 for pending MRCP, endorses severe hunger and wishes to eat.       HPI:  Patient is a 31 year old female with no PMHx who presented with severe epigastric pain x3 days. (04 Jul 2022 04:12)      PAST MEDICAL & SURGICAL HISTORY:  No pertinent past medical history  No significant past surgical history      ALLERGIES:  No Known Allergies    --------------------------------------------------------------------------------------    MEDICATIONS:    Neurologic Medications  acetaminophen   IVPB .. 1000 milliGRAM(s) IV Intermittent every 6 hours  HYDROmorphone  Injectable 0.5 milliGRAM(s) IV Push every 4 hours PRN Severe Pain (7 - 10)  HYDROmorphone  Injectable 0.25 milliGRAM(s) IV Push every 4 hours PRN Moderate Pain (4 - 6)  metoclopramide Injectable 10 milliGRAM(s) IV Push every 6 hours PRN nausea  ondansetron Injectable 4 milliGRAM(s) IV Push every 8 hours PRN Nausea and/or Vomiting    Gastrointestinal Medications  lactated ringers. 1000 milliLiter(s) IV Continuous <Continuous>    Hematologic/Oncologic Medications  enoxaparin Injectable 40 milliGRAM(s) SubCutaneous every 24 hours    Antimicrobial/Immunologic Medications  piperacillin/tazobactam IVPB.. 3.375 Gram(s) IV Intermittent every 8 hours    --------------------------------------------------------------------------------------    VITAL SIGNS:  Vital Signs Last 24 Hrs  T(C): 36.4 (06 Jul 2022 09:47), Max: 37.6 (06 Jul 2022 02:00)  T(F): 97.6 (06 Jul 2022 09:47), Max: 99.6 (06 Jul 2022 02:00)  HR: 86 (06 Jul 2022 09:47) (86 - 96)  BP: 139/77 (06 Jul 2022 09:47) (119/53 - 143/74)  BP(mean): --  RR: 18 (06 Jul 2022 09:47) (17 - 18)  SpO2: 100% (06 Jul 2022 09:47) (98% - 100%)    --------------------------------------------------------------------------------------    INS AND OUTS:    05 Jul 2022 07:01  -  06 Jul 2022 07:00  --------------------------------------------------------  IN:  Total IN: 0 mL    OUT:    Voided (mL): 250 mL  Total OUT: 250 mL    Total NET: -250 mL    --------------------------------------------------------------------------------------    EXAM    NEUROLOGY  Exam: Normal, in no acute distress.    HEENT  Exam: Normocephalic, atraumatic.    RESPIRATORY  Exam: Normal expansion / effort.    CARDIOVASCULAR  Exam: S1, S2.  Regular rate and rhythm.    GI/NUTRITION  Exam: Abdomen soft, Non-tender, Non-distended.  Current Diet: Clear liquid diet    MUSCULOSKELETAL  Exam: All extremities moving spontaneously without limitations.      METABOLIC / FLUIDS / ELECTROLYTES  lactated ringers. 1000 milliLiter(s) IV Continuous <Continuous>      HEMATOLOGIC  [x] VTE Prophylaxis: enoxaparin Injectable 40 milliGRAM(s) SubCutaneous every 24 hours      INFECTIOUS DISEASE  Antimicrobials/Immunologic Medications:  piperacillin/tazobactam IVPB.. 3.375 Gram(s) IV Intermittent every 8 hours    -------------------------------------------------------------------------------------- Surgery Daily Progress Note  =====================================================  Interval / Overnight Events: VIKKI overnight. Patient reports feeling hungry.       HPI:  Patient is a 31 year old female with no PMHx who presented with severe epigastric pain x3 days. (04 Jul 2022 04:12)      PAST MEDICAL & SURGICAL HISTORY:  No pertinent past medical history  No significant past surgical history      ALLERGIES:  No Known Allergies    --------------------------------------------------------------------------------------    MEDICATIONS:    Neurologic Medications  acetaminophen   IVPB .. 1000 milliGRAM(s) IV Intermittent every 6 hours  HYDROmorphone  Injectable 0.5 milliGRAM(s) IV Push every 4 hours PRN Severe Pain (7 - 10)  HYDROmorphone  Injectable 0.25 milliGRAM(s) IV Push every 4 hours PRN Moderate Pain (4 - 6)  metoclopramide Injectable 10 milliGRAM(s) IV Push every 6 hours PRN nausea  ondansetron Injectable 4 milliGRAM(s) IV Push every 8 hours PRN Nausea and/or Vomiting    Gastrointestinal Medications  lactated ringers. 1000 milliLiter(s) IV Continuous <Continuous>    Hematologic/Oncologic Medications  enoxaparin Injectable 40 milliGRAM(s) SubCutaneous every 24 hours    Antimicrobial/Immunologic Medications  piperacillin/tazobactam IVPB.. 3.375 Gram(s) IV Intermittent every 8 hours    --------------------------------------------------------------------------------------    VITAL SIGNS:  Vital Signs Last 24 Hrs  T(C): 36.4 (06 Jul 2022 09:47), Max: 37.6 (06 Jul 2022 02:00)  T(F): 97.6 (06 Jul 2022 09:47), Max: 99.6 (06 Jul 2022 02:00)  HR: 86 (06 Jul 2022 09:47) (86 - 96)  BP: 139/77 (06 Jul 2022 09:47) (119/53 - 143/74)  BP(mean): --  RR: 18 (06 Jul 2022 09:47) (17 - 18)  SpO2: 100% (06 Jul 2022 09:47) (98% - 100%)    --------------------------------------------------------------------------------------    INS AND OUTS:    05 Jul 2022 07:01  -  06 Jul 2022 07:00  --------------------------------------------------------  IN:  Total IN: 0 mL    OUT:    Voided (mL): 250 mL  Total OUT: 250 mL    Total NET: -250 mL    --------------------------------------------------------------------------------------    EXAM    NEUROLOGY  Exam: Normal, in no acute distress.    HEENT  Exam: Normocephalic, atraumatic.    RESPIRATORY  Exam: Normal expansion / effort.    CARDIOVASCULAR  Exam: S1, S2.  Regular rate and rhythm.    GI/NUTRITION  Exam: Abdomen soft, Non-tender, Non-distended.  Current Diet: Clear liquid diet    MUSCULOSKELETAL  Exam: All extremities moving spontaneously without limitations.      METABOLIC / FLUIDS / ELECTROLYTES  lactated ringers. 1000 milliLiter(s) IV Continuous <Continuous>      HEMATOLOGIC  [x] VTE Prophylaxis: enoxaparin Injectable 40 milliGRAM(s) SubCutaneous every 24 hours      INFECTIOUS DISEASE  Antimicrobials/Immunologic Medications:  piperacillin/tazobactam IVPB.. 3.375 Gram(s) IV Intermittent every 8 hours    --------------------------------------------------------------------------------------

## 2022-07-06 NOTE — PROGRESS NOTE ADULT - ASSESSMENT
31y Female with no PMH presenting for 3 days of severe epigastric pain. Patient endorses sudden onset RUQ and epigastric pain that began on Friday worse with po c/b nausea and NBNB vomiting found to have gallstone pancreatitis, choledocho, and acute cholecystitis.    # Gallstone pancreatitis  # Choledocholithiasis - improving bili  # Ac Cholecystitis  -  CT gallstones w/i CBD, gallstones with GB wall thickening and pericholecystic fluid and an edematous pancreas.     Recommendations:  - plan for cholecystectomy w/ surgery  - given improvement in labs, lower concern for choledocho at this time  - please call back with any questions or concerns    Note not finalized until signed by attending.    Nia Ibarra PGY-6  Gastroenterology/Hepatology Fellow  Pager #[phone] (SY) or [phone] (NS)  Available on [employer] Teams.  Please contact on-call GI fellow via answering service ([phone]) after 5pm and before 8am, and on weekends.

## 2022-07-06 NOTE — PROGRESS NOTE ADULT - SUBJECTIVE AND OBJECTIVE BOX
Chief Complaint:  Patient is a 31y old  Female who presents with a chief complaint of Acute cholecystitis, gallstone pancreatitis and choledocholithiasis (06 Jul 2022 01:22)      Interval Events: no acute events overnight  - patient tolerating clears this AM  - bili normalized, transaminases improving/normalized      Hospital Medications:  acetaminophen   IVPB .. 1000 milliGRAM(s) IV Intermittent every 6 hours  enoxaparin Injectable 40 milliGRAM(s) SubCutaneous every 24 hours  HYDROmorphone  Injectable 0.5 milliGRAM(s) IV Push every 4 hours PRN  HYDROmorphone  Injectable 0.25 milliGRAM(s) IV Push every 4 hours PRN  lactated ringers. 1000 milliLiter(s) IV Continuous <Continuous>  metoclopramide Injectable 10 milliGRAM(s) IV Push every 6 hours PRN  ondansetron Injectable 4 milliGRAM(s) IV Push every 8 hours PRN  piperacillin/tazobactam IVPB.. 3.375 Gram(s) IV Intermittent every 8 hours      PMHX/PSHX:  No pertinent past medical history    No significant past surgical history            ROS:     General:  No weight loss, fevers, chills, night sweats, fatigue   Eyes:  No vision changes  ENT:  No sore throat, pain, runny nose  CV:  No chest pain, palpitations, dizziness   Resp:  No SOB, cough, wheezing  GI:  See HPI  :  No burning with urination, hematuria  Muscle:  No pain, weakness  Neuro:  No weakness/tingling, memory problems  Psych:  No fatigue, insomnia, mood problems, depression  Heme:  No easy bruisability  Skin:  No rash, edema      PHYSICAL EXAM:     GENERAL:  Well developed, no distress  HEENT:  NC/AT,  conjunctivae clear, sclera anicteric  CHEST:  Full & symmetric excursion, no increased effort w/ respirations  HEART:  Regular rhythm & rate  ABDOMEN:  Soft, non-tender, non-distended  EXTREMITIES:  no LE  edema  SKIN:  No rash/erythema/ecchymoses/petechiae/wounds/jaundice  NEURO:  Alert, oriented    Vital Signs:  Vital Signs Last 24 Hrs  T(C): 36.4 (06 Jul 2022 09:47), Max: 37.6 (06 Jul 2022 02:00)  T(F): 97.6 (06 Jul 2022 09:47), Max: 99.6 (06 Jul 2022 02:00)  HR: 86 (06 Jul 2022 09:47) (86 - 96)  BP: 139/77 (06 Jul 2022 09:47) (119/53 - 143/74)  BP(mean): --  RR: 18 (06 Jul 2022 09:47) (17 - 18)  SpO2: 100% (06 Jul 2022 09:47) (98% - 100%)  Daily     Daily     LABS:                        11.4   15.90 )-----------( 284      ( 06 Jul 2022 06:45 )             34.4     07-06    135  |  100  |  7   ----------------------------<  82  3.4<L>   |  23  |  0.60    Ca    8.1<L>      06 Jul 2022 06:45  Phos  2.2     07-06  Mg     1.90     07-06    TPro  6.5  /  Alb  3.2<L>  /  TBili  1.0  /  DBili  0.4<H>  /  AST  23  /  ALT  108<H>  /  AlkPhos  84  07-06    LIVER FUNCTIONS - ( 06 Jul 2022 06:45 )  Alb: 3.2 g/dL / Pro: 6.5 g/dL / ALK PHOS: 84 U/L / ALT: 108 U/L / AST: 23 U/L / GGT: x                   Imaging:

## 2022-07-06 NOTE — PROGRESS NOTE ADULT - ASSESSMENT
32yo F with no PMH presenting for 3 days of severe epigastric pain found to have acute cholecystitis, gallstone pancreatitis and possible choledocholithiasis. Currently hemodynamically stable.    PLAN:  - NPO/IVF  - Zosyn  - MRCP to evaluate for choledocho given elevated tbili  - GI consulted via email  - Trend LFTs and tbili  - Monitor abdominal exam      B Team Surgery   [id number]    Patient is a 31 year old female with no PMHx who presented with severe epigastric pain x3 days.      PLAN:  - NPO/IVF  - Zosyn  - MRCP to evaluate for choledocho given elevated tbili  - GI consulted via email  - Trend LFTs and tbili  - Monitor abdominal exam      B Team Surgery   [id number]    Patient is a 31 year old female with no PMHx who presented with severe epigastric pain x3 days, found to have gall stone pancreatitis, initially planned for MRCP to evaluate choledocho, however, repeat Tbili down to 1 with normalized LFTs, new plan for OR for lap deejay tomorrow 7/7.    PLAN:  - Clears/IVF  - NPOpM for lap deejay tomorrow 7/7  - Will add-on to OR list at 3PM   - Zosyn  - Trend LFTs and tbili  - Monitor abdominal exam    B Team Surgery   [id number]

## 2022-07-07 LAB
ALBUMIN SERPL ELPH-MCNC: 3.2 G/DL — LOW (ref 3.3–5)
ALP SERPL-CCNC: 69 U/L — SIGNIFICANT CHANGE UP (ref 40–120)
ALT FLD-CCNC: 82 U/L — HIGH (ref 4–33)
ANION GAP SERPL CALC-SCNC: 9 MMOL/L — SIGNIFICANT CHANGE UP (ref 7–14)
APTT BLD: 32.2 SEC — SIGNIFICANT CHANGE UP (ref 27–36.3)
AST SERPL-CCNC: 19 U/L — SIGNIFICANT CHANGE UP (ref 4–32)
BILIRUB SERPL-MCNC: 1.1 MG/DL — SIGNIFICANT CHANGE UP (ref 0.2–1.2)
BLD GP AB SCN SERPL QL: NEGATIVE — SIGNIFICANT CHANGE UP
BUN SERPL-MCNC: 5 MG/DL — LOW (ref 7–23)
CALCIUM SERPL-MCNC: 8.2 MG/DL — LOW (ref 8.4–10.5)
CHLORIDE SERPL-SCNC: 101 MMOL/L — SIGNIFICANT CHANGE UP (ref 98–107)
CO2 SERPL-SCNC: 26 MMOL/L — SIGNIFICANT CHANGE UP (ref 22–31)
CREAT SERPL-MCNC: 0.68 MG/DL — SIGNIFICANT CHANGE UP (ref 0.5–1.3)
EGFR: 119 ML/MIN/1.73M2 — SIGNIFICANT CHANGE UP
GLUCOSE SERPL-MCNC: 94 MG/DL — SIGNIFICANT CHANGE UP (ref 70–99)
HCG SERPL-ACNC: <5 MIU/ML — SIGNIFICANT CHANGE UP
HCT VFR BLD CALC: 33.7 % — LOW (ref 34.5–45)
HGB BLD-MCNC: 11.1 G/DL — LOW (ref 11.5–15.5)
INR BLD: 1.73 RATIO — HIGH (ref 0.88–1.16)
MAGNESIUM SERPL-MCNC: 1.8 MG/DL — SIGNIFICANT CHANGE UP (ref 1.6–2.6)
MCHC RBC-ENTMCNC: 28.9 PG — SIGNIFICANT CHANGE UP (ref 27–34)
MCHC RBC-ENTMCNC: 32.9 GM/DL — SIGNIFICANT CHANGE UP (ref 32–36)
MCV RBC AUTO: 87.8 FL — SIGNIFICANT CHANGE UP (ref 80–100)
NRBC # BLD: 0 /100 WBCS — SIGNIFICANT CHANGE UP
NRBC # FLD: 0 K/UL — SIGNIFICANT CHANGE UP
PHOSPHATE SERPL-MCNC: 2.2 MG/DL — LOW (ref 2.5–4.5)
PLATELET # BLD AUTO: 307 K/UL — SIGNIFICANT CHANGE UP (ref 150–400)
POTASSIUM SERPL-MCNC: 3.5 MMOL/L — SIGNIFICANT CHANGE UP (ref 3.5–5.3)
POTASSIUM SERPL-SCNC: 3.5 MMOL/L — SIGNIFICANT CHANGE UP (ref 3.5–5.3)
PROT SERPL-MCNC: 6.4 G/DL — SIGNIFICANT CHANGE UP (ref 6–8.3)
PROTHROM AB SERPL-ACNC: 20.2 SEC — HIGH (ref 10.5–13.4)
RBC # BLD: 3.84 M/UL — SIGNIFICANT CHANGE UP (ref 3.8–5.2)
RBC # FLD: 12.6 % — SIGNIFICANT CHANGE UP (ref 10.3–14.5)
RH IG SCN BLD-IMP: POSITIVE — SIGNIFICANT CHANGE UP
SODIUM SERPL-SCNC: 136 MMOL/L — SIGNIFICANT CHANGE UP (ref 135–145)
WBC # BLD: 11.89 K/UL — HIGH (ref 3.8–10.5)
WBC # FLD AUTO: 11.89 K/UL — HIGH (ref 3.8–10.5)

## 2022-07-07 PROCEDURE — 99232 SBSQ HOSP IP/OBS MODERATE 35: CPT | Mod: 57

## 2022-07-07 PROCEDURE — 88304 TISSUE EXAM BY PATHOLOGIST: CPT | Mod: 26

## 2022-07-07 PROCEDURE — 47562 LAPAROSCOPIC CHOLECYSTECTOMY: CPT | Mod: GC

## 2022-07-07 DEVICE — LIGATING CLIPS WECK HEMOLOK POLYMER MEDIUM-LARGE (GREEN) 6: Type: IMPLANTABLE DEVICE | Status: FUNCTIONAL

## 2022-07-07 RX ORDER — ACETAMINOPHEN 500 MG
650 TABLET ORAL EVERY 6 HOURS
Refills: 0 | Status: DISCONTINUED | OUTPATIENT
Start: 2022-07-07 | End: 2022-07-07

## 2022-07-07 RX ORDER — METOCLOPRAMIDE HCL 10 MG
10 TABLET ORAL ONCE
Refills: 0 | Status: DISCONTINUED | OUTPATIENT
Start: 2022-07-07 | End: 2022-07-07

## 2022-07-07 RX ORDER — FENTANYL CITRATE 50 UG/ML
25 INJECTION INTRAVENOUS
Refills: 0 | Status: DISCONTINUED | OUTPATIENT
Start: 2022-07-07 | End: 2022-07-07

## 2022-07-07 RX ORDER — HYDROMORPHONE HYDROCHLORIDE 2 MG/ML
0.5 INJECTION INTRAMUSCULAR; INTRAVENOUS; SUBCUTANEOUS
Refills: 0 | Status: DISCONTINUED | OUTPATIENT
Start: 2022-07-07 | End: 2022-07-07

## 2022-07-07 RX ORDER — OXYCODONE HYDROCHLORIDE 5 MG/1
5 TABLET ORAL EVERY 4 HOURS
Refills: 0 | Status: DISCONTINUED | OUTPATIENT
Start: 2022-07-07 | End: 2022-07-07

## 2022-07-07 RX ORDER — OXYCODONE HYDROCHLORIDE 5 MG/1
5 TABLET ORAL ONCE
Refills: 0 | Status: DISCONTINUED | OUTPATIENT
Start: 2022-07-07 | End: 2022-07-07

## 2022-07-07 RX ORDER — OXYCODONE HYDROCHLORIDE 5 MG/1
1 TABLET ORAL
Qty: 10 | Refills: 0
Start: 2022-07-07

## 2022-07-07 RX ORDER — IBUPROFEN 200 MG
600 TABLET ORAL EVERY 6 HOURS
Refills: 0 | Status: DISCONTINUED | OUTPATIENT
Start: 2022-07-07 | End: 2022-07-07

## 2022-07-07 RX ORDER — POTASSIUM PHOSPHATE, MONOBASIC POTASSIUM PHOSPHATE, DIBASIC 236; 224 MG/ML; MG/ML
15 INJECTION, SOLUTION INTRAVENOUS ONCE
Refills: 0 | Status: COMPLETED | OUTPATIENT
Start: 2022-07-07 | End: 2022-07-07

## 2022-07-07 RX ORDER — ONDANSETRON 8 MG/1
4 TABLET, FILM COATED ORAL ONCE
Refills: 0 | Status: DISCONTINUED | OUTPATIENT
Start: 2022-07-07 | End: 2022-07-07

## 2022-07-07 RX ORDER — OXYCODONE HYDROCHLORIDE 5 MG/1
2.5 TABLET ORAL EVERY 4 HOURS
Refills: 0 | Status: DISCONTINUED | OUTPATIENT
Start: 2022-07-07 | End: 2022-07-07

## 2022-07-07 RX ADMIN — POTASSIUM PHOSPHATE, MONOBASIC POTASSIUM PHOSPHATE, DIBASIC 62.5 MILLIMOLE(S): 236; 224 INJECTION, SOLUTION INTRAVENOUS at 11:08

## 2022-07-07 RX ADMIN — Medication 400 MILLIGRAM(S): at 04:06

## 2022-07-07 RX ADMIN — PIPERACILLIN AND TAZOBACTAM 25 GRAM(S): 4; .5 INJECTION, POWDER, LYOPHILIZED, FOR SOLUTION INTRAVENOUS at 07:14

## 2022-07-07 RX ADMIN — OXYCODONE HYDROCHLORIDE 5 MILLIGRAM(S): 5 TABLET ORAL at 19:45

## 2022-07-07 RX ADMIN — SODIUM CHLORIDE 75 MILLILITER(S): 9 INJECTION, SOLUTION INTRAVENOUS at 11:09

## 2022-07-07 NOTE — DISCHARGE NOTE PROVIDER - NSDCCPCAREPLAN_GEN_ALL_CORE_FT
PRINCIPAL DISCHARGE DIAGNOSIS  Diagnosis: Gallstone pancreatitis  Assessment and Plan of Treatment:

## 2022-07-07 NOTE — DISCHARGE NOTE PROVIDER - HOSPITAL COURSE
Patient is a 31 year old female with no PMHx who presented with severe epigastric pain x3 days, found to have gall stone pancreatitis, initially planned for MRCP to evaluate choledocholithiasis, however, repeat Tbili down to 1 with normalized LFTs. On HOD 3 patient underwent laparoscopic cholecystectomy. Patient recovered well in PACU and was discharged home on HOD 3 tolerating regular diet

## 2022-07-07 NOTE — DISCHARGE NOTE PROVIDER - CARE PROVIDER_API CALL
Dimas Tim)  Surgery  Critical Care  [address] [place] [address]  Phone: [phone]  Fax: [phone]  Follow Up Time: 2 months

## 2022-07-07 NOTE — PROGRESS NOTE ADULT - ASSESSMENT
Patient is a 31 year old female with no PMHx who presented with severe epigastric pain x3 days, found to have gall stone pancreatitis, initially planned for MRCP to evaluate choledocho, however, repeat Tbili down to 1 with normalized LFTs, new plan for OR for lap deejay tomorrow 7/7.    PLAN:  - Clears/IVF  - NPOpM for lap deejay today 7/7  - Zosyn  - Trend LFTs and tbili  - Monitor abdominal exam    B Team Surgery   [id number]    Patient is a 31 year old female with no PMHx who presented with severe epigastric pain x3 days, found to have gall stone pancreatitis, initially planned for MRCP to evaluate choledocho, however, repeat Tbili down to 1 with normalized LFTs.    PLAN:  - OR today for lap deejay 7/7.  - Zosyn  - Trend LFTs and tbili  - Monitor abdominal exam    B Team Surgery   [id number]    Patient is a 31 year old female with no PMHx who presented with severe epigastric pain x3 days, found to have gall stone pancreatitis, initially planned for MRCP to evaluate choledocho, however, repeat Tbili down to 1 with normalized LFTs.    PLAN:  - OR today for lap deejay 7/7.  - Zosyn  - Trend LFTs and tbili  - Monitor abdominal exam  - Advance diet as tolerated post-op  - Encourage ambulation OOB post-op  - Pain control  - Vitals q4h  - Strict I&Os  - DVT ppx: SCDs    B Team Surgery   [id number]

## 2022-07-07 NOTE — PROGRESS NOTE ADULT - ATTENDING SUPERVISION STATEMENT
Fellow
Resident
Fellow
Resident
-had Afib with RVR on EKG and on tele, s/p IV metoprolol 5mg IV by ER  -on metoprolol tartrate  25mg bid for now, may need uptitration to 50mg bid  -monitor on tele  cont eliquis  -cardio consult with Dr Woods appreciated  tTE pending but there is backlog
Resident

## 2022-07-07 NOTE — PROGRESS NOTE ADULT - SUBJECTIVE AND OBJECTIVE BOX
Overnight events: BGL 90mg/dL    SUBJECTIVE: Pt seen and examined at bedside.       OBJECTIVE:  Vital Signs Last 24 Hrs  T(C): 37.1 (07 Jul 2022 01:58), Max: 37.1 (06 Jul 2022 17:25)  T(F): 98.7 (07 Jul 2022 01:58), Max: 98.8 (06 Jul 2022 21:38)  HR: 85 (07 Jul 2022 01:58) (79 - 89)  BP: 137/71 (07 Jul 2022 01:58) (129/63 - 140/75)  BP(mean): --  RR: 18 (07 Jul 2022 01:58) (18 - 18)  SpO2: 100% (07 Jul 2022 01:58) (99% - 100%)      07-05-22 @ 07:01  -  07-06-22 @ 07:00  --------------------------------------------------------  IN: 0 mL / OUT: 250 mL / NET: -250 mL        Physical Examination:  GEN: NAD, resting quietly  PULM: symmetric chest rise bilaterally, no increased WOB  ABD: soft, appropriately tender, nondistended, no rebound or guarding, incision CDI  EXTR: no LE erythema, moving all extremities      LABS:                        11.1   11.89 )-----------( 307      ( 07 Jul 2022 03:45 )             33.7       07-06    135  |  100  |  7   ----------------------------<  82  3.4<L>   |  23  |  0.60    Ca    8.1<L>      06 Jul 2022 06:45  Phos  2.2     07-06  Mg     1.90     07-06    TPro  6.5  /  Alb  3.2<L>  /  TBili  1.0  /  DBili  0.4<H>  /  AST  23  /  ALT  108<H>  /  AlkPhos  84  07-06       HD# 4    Overnight events: BGL 90mg/dL    SUBJECTIVE: Pt seen and examined at bedside. Resting comfortably, in NAD, pain well controlled. NPO for lap deejay this AM.    OBJECTIVE:  Vital Signs Last 24 Hrs  T(C): 37 (07 Jul 2022 06:46), Max: 37.1 (06 Jul 2022 17:25)  T(F): 98.6 (07 Jul 2022 06:46), Max: 98.8 (06 Jul 2022 21:38)  HR: 75 (07 Jul 2022 06:46) (75 - 86)  BP: 137/76 (07 Jul 2022 06:46) (137/71 - 140/75)  BP(mean): 90 (07 Jul 2022 06:46) (90 - 90)  RR: 18 (07 Jul 2022 06:46) (18 - 18)  SpO2: 100% (07 Jul 2022 06:46) (99% - 100%)    I&O's not recorded 7/6 - 7/7 07-05-22 @ 07:01  -  07-06-22 @ 07:00  --------------------------------------------------------  IN: 0 mL / OUT: 250 mL / NET: -250 mL      Physical Examination:  GEN: NAD, resting quietly  PULM: symmetric chest rise bilaterally, no increased WOB  ABD: soft, appropriately tender, nondistended, no rebound or guarding  EXTR: no LE erythema, moving all extremities      LABS:                      11.1   11.89 )-----------( 307      ( 07 Jul 2022 03:45 )             33.7     07-07    136  |  101  |  5<L>  ----------------------------<  94  3.5   |  26  |  0.68    Ca    8.2<L>      07 Jul 2022 03:45  Phos  2.2     07-07  Mg     1.80     07-07    TPro  6.4  /  Alb  3.2<L>  /  TBili  1.1  /  DBili  x   /  AST  19  /  ALT  82<H>  /  AlkPhos  69  07-07

## 2022-07-07 NOTE — PROGRESS NOTE ADULT - ATTENDING COMMENTS
As above.    Impression:    #1.  Gallstone pancreatitis  #2.  Possible choledocholithiasis on CT  #3.  Abnormal LFTs    Recommendations:    #1. Follow CBC/CMP  #2.  MRCP to look for choledocholithiasis.  #3.  IV fluids.
gallstone pancreatitis, patient still endorses epigastric pain  awaiting MRCP to determine if ERCP is needed  patient understands that lap CCY will be scheduled pending clinical resolution of pancreatitis and exoneration of cbd stones
Patient is a 31y old  Female who presents with a chief complaint of Acute cholecystitis, gallstone pancreatitis and choledocholithiasis.    Plan: - plan for cholecystectomy w/ surgery, given improvement in labs, lower concern for choledocho at this time. Suggest IOC.
Patient with gallstone pancreatitis. Patient pancreatitis has resolved, lfts downtrended to normal  plan  same admission cholecystectomy  npo  possible d/c after lap deejay    I have personally interviewed and examined this patient, reviewed pertinent labs and imaging, and discussed the case with colleagues, residents, and physician assistants on B Team rounds.    The active care issues are:  1. gallstone pancreatitis    The Acute Care Surgery (B Team) Attending Group Practice:  Dr. Kt Solorzano, Dr. Errol Perez, Dr. Dejan Menon, Dr. Dimas Tim,     urgent issues - spectra [phone]  nonurgent issues - [phone]  patient appointments or afterhours - [phone]
Patient with downtrending lfts, likely passed cbd stone. Patient pain improved pancreatitis clinically appears to be resolving.  plan  same admission cholecystectomy  preop for tomorrow    I have personally interviewed and examined this patient, reviewed pertinent labs and imaging, and discussed the case with colleagues, residents, and physician assistants on B Team rounds.    The active care issues are:  1. gallstone pancreatitis    The Acute Care Surgery (B Team) Attending Group Practice:  Dr. Kt Solorzano, Dr. Errol Perez, Dr. Dejan Menon, Dr. Dimas Tim,     urgent issues - spectra [phone]  nonurgent issues - [phone]  patient appointments or afterhours - [phone]

## 2022-07-08 VITALS
RESPIRATION RATE: 17 BRPM | SYSTOLIC BLOOD PRESSURE: 136 MMHG | OXYGEN SATURATION: 98 % | HEART RATE: 89 BPM | DIASTOLIC BLOOD PRESSURE: 68 MMHG | TEMPERATURE: 98 F

## 2022-07-08 RX ORDER — OXYCODONE HYDROCHLORIDE 5 MG/1
5 TABLET ORAL ONCE
Refills: 0 | Status: DISCONTINUED | OUTPATIENT
Start: 2022-07-08 | End: 2022-07-08

## 2022-07-08 RX ORDER — ACETAMINOPHEN 500 MG
650 TABLET ORAL ONCE
Refills: 0 | Status: COMPLETED | OUTPATIENT
Start: 2022-07-08 | End: 2022-07-08

## 2022-07-08 RX ADMIN — OXYCODONE HYDROCHLORIDE 5 MILLIGRAM(S): 5 TABLET ORAL at 03:54

## 2022-07-08 RX ADMIN — Medication 650 MILLIGRAM(S): at 09:27

## 2022-07-08 RX ADMIN — OXYCODONE HYDROCHLORIDE 5 MILLIGRAM(S): 5 TABLET ORAL at 02:35

## 2022-07-08 NOTE — PROGRESS NOTE ADULT - SUBJECTIVE AND OBJECTIVE BOX
POD # 1 lap deejay    Overnight events: Pt was not comfortable going home so she refused discharge.     SUBJECTIVE: Pt seen and examined at bedside.       OBJECTIVE:  Vital Signs Last 24 Hrs  T(C): 36.5 (08 Jul 2022 01:25), Max: 37.1 (07 Jul 2022 06:24)  T(F): 97.7 (08 Jul 2022 01:25), Max: 98.7 (07 Jul 2022 06:24)  HR: 69 (08 Jul 2022 01:25) (69 - 87)  BP: 127/62 (08 Jul 2022 01:25) (117/59 - 137/81)  BP(mean): 85 (07 Jul 2022 17:30) (74 - 90)  RR: 17 (08 Jul 2022 01:25) (14 - 18)  SpO2: 100% (08 Jul 2022 01:25) (97% - 100%)    Parameters below as of 08 Jul 2022 01:25  Patient On (Oxygen Delivery Method): room air          07-07-22 @ 07:01  -  07-08-22 @ 02:25  --------------------------------------------------------  IN: 815 mL / OUT: 1200 mL / NET: -385 mL        Physical Examination:  GEN: NAD, resting quietly  PULM: symmetric chest rise bilaterally, no increased WOB  ABD: soft, appropriately tender, nondistended, no rebound or guarding, incision CDI  EXTR: no LE erythema, moving all extremities      LABS:                        11.1   11.89 )-----------( 307      ( 07 Jul 2022 03:45 )             33.7       07-07    136  |  101  |  5<L>  ----------------------------<  94  3.5   |  26  |  0.68    Ca    8.2<L>      07 Jul 2022 03:45  Phos  2.2     07-07  Mg     1.80     07-07    TPro  6.4  /  Alb  3.2<L>  /  TBili  1.1  /  DBili  x   /  AST  19  /  ALT  82<H>  /  AlkPhos  69  07-07       Surgery Daily Progress Note  =====================================================  Interval / Overnight Events: No acute events overnight.      HPI:  Patient is a 31 year old female with no PMHx who presented with severe epigastric pain x3 days. (04 Jul 2022 04:12)      PAST MEDICAL & SURGICAL HISTORY:  No pertinent past medical history  No significant past surgical history      ALLERGIES:  No Known Allergies    --------------------------------------------------------------------------------------    VITAL SIGNS:  T(C): 36.7 (08 Jul 2022 09:38), Max: 37.1 (07 Jul 2022 12:20)  T(F): 98.1 (08 Jul 2022 09:38), Max: 98.7 (07 Jul 2022 12:20)  HR: 89 (08 Jul 2022 09:38) (69 - 89)  BP: 136/68 (08 Jul 2022 09:38) (117/59 - 137/81)  BP(mean): 85 (07 Jul 2022 17:30) (74 - 89)  RR: 17 (08 Jul 2022 09:38) (14 - 18)  SpO2: 98% (08 Jul 2022 09:38) (97% - 100%)    --------------------------------------------------------------------------------------    INS AND OUTS:    07 Jul 2022 07:01  -  08 Jul 2022 07:00  --------------------------------------------------------  IN:    IV PiggyBack: 125 mL    Lactated Ringers: 450 mL    Oral Fluid: 240 mL  Total IN: 815 mL    OUT:    Voided (mL): 1200 mL  Total OUT: 1200 mL    Total NET: -385 mL    --------------------------------------------------------------------------------------    EXAM    NEUROLOGY  Exam: Normal, in no acute distress.    HEENT  Exam: Normocephalic, atraumatic.    RESPIRATORY  Exam: Normal expansion / effort.  Mechanical Ventilation:     CARDIOVASCULAR  Exam: S1, S2.  Regular rate and rhythm.    GI/NUTRITION  Exam: Abdomen soft, Non-tender, Non-distended.  Port sites clean, dry and intact.  Current Diet: Regular diet    MUSCULOSKELETAL  Exam: All extremities moving spontaneously without limitations.    --------------------------------------------------------------------------------------

## 2022-07-08 NOTE — DISCHARGE NOTE NURSING/CASE MANAGEMENT/SOCIAL WORK - NSDCPEFALRISK_GEN_ALL_CORE
For information on Fall & Injury Prevention, visit: [email or web address] OR  [email or web address] OR  https://www.cdc.gov/steadi/patient.html

## 2022-07-08 NOTE — PROGRESS NOTE ADULT - ASSESSMENT
Patient is a 31 year old female with no PMHx who presented with severe epigastric pain x3 days, found to have gall stone pancreatitis, initially planned for MRCP to evaluate choledocho, however, repeat Tbili down to 1 with normalized LFTs. S/p lap deejay 7/7    Plan:        B Team Surgery  [id number]     Patient is a 31 year old female with no PMHx who presented with severe epigastric pain x3 days.  CT Abdomen / Pelvis performed showing gallstone pancreatitis.  Small volume abdominopelvic ascites.  Indeterminate structure seen on prior ultrasound likely corresponding to bowel.  Patient is now S/P a laparoscopic cholecystectomy on 7/7/22.      PLAN:  - Regular diet  - Out of bed  - Pain control  - Discharge home today      #[id number]  B Team Surgery

## 2022-07-08 NOTE — DISCHARGE NOTE NURSING/CASE MANAGEMENT/SOCIAL WORK - PATIENT PORTAL LINK FT
You can access the FollowMyHealth Patient Portal offered by [facility] by registering at the following website: [email or web address]. By joining [facility]’s FollowMyHealth portal, you will also be able to view your health information using other applications (apps) compatible with our system.

## 2022-07-08 NOTE — DISCHARGE NOTE NURSING/CASE MANAGEMENT/SOCIAL WORK - NSDCPNINST_GEN_ALL_CORE
Make a follow up appointment with DR. Jacinto. Call MD if you develop a fever, or if there is redness, swelling, drainage or pain not relieved by pain medication. No heavy lifting, bending, or straining to move your bowels. Take over the counter stool softeners as needed to prevent constipation which may be caused by pain medication.

## 2022-07-18 LAB — SURGICAL PATHOLOGY STUDY: SIGNIFICANT CHANGE UP

## 2023-10-03 NOTE — PATIENT PROFILE ADULT - NSPROPATIENTLACTATING_GEN_A_NUR
Chemo school. Ok for chemo on the 16th. RTC 1 week later for tox check.
Labs before treatment though.
no

## 2024-02-14 NOTE — ED PROVIDER NOTE - WR ORDER DATE AND TIME 1
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  The condition, natural history, and prognosis were explained to the family. Today's visit included obtaining the history from the child and parent, due to the child's age, the child could not be considered a reliable historian, requiring the parent to act as an independent historian. The clinical findings and images were reviewed with the family.  X-rays of the right ankle performed at an outside facility on 2/5/2024 were reviewed, with no evidence of fracture or osseous abnormality.  Clinically she does have bilateral flatfeet as well as bilateral heel pain consistent with Sever's apophysitis.  Conservative management for Sever's apophysitis was discussed at length including ice, gel heel cups, and activity modification as needed.  We also discussed flatfeet at length.  Supportive sneakers and over-the-counter arch supports were recommended.  She can continue to participate in activities as tolerated.  Follow-up recommended in my office on an as-needed basis. All questions and concerns were addressed today. Family verbalizes understanding and agree with plan of care.   I, Kayla Malik PA-C, have acted as a scribe and documented the above information for Dr. Mcneil.  03-Jul-2022 19:47

## 2025-04-08 NOTE — H&P ADULT - NSICDXNOPASTMEDICALHX_GEN_ALL_CORE
No care due was identified.  [facility] Embedded Care Due Messages. Reference number: [id number].   4/08/2025 5:35:40 PM CDT   <-- Click to add NO pertinent Past Medical History

## 2025-04-17 NOTE — ED ADULT TRIAGE NOTE - TEMPERATURE IN CELSIUS (DEGREES C)
Medication(s) Requested: phentermine  Last office visit: 2/20/2025  Last refill: 3/16/2025  Is the patient due for refill of this medication(s): Yes  PDMP review: Criteria met. Forwarded to Physician/CHANCE for signature.      36.9

## (undated) DEVICE — GLV 7.5 PROTEXIS (CREAM) MICRO

## (undated) DEVICE — POSITIONER STRAP ARMBOARD VELCRO TS-30

## (undated) DEVICE — ELCTR GROUNDING PAD ADULT COVIDIEN

## (undated) DEVICE — DRAPE 3/4 SHEET 52X76"

## (undated) DEVICE — BLADE SURGICAL #15 CARBON

## (undated) DEVICE — TUBING OLYMPUS INSUFFLATION

## (undated) DEVICE — DRSG STERISTRIPS 0.5 X 4"

## (undated) DEVICE — VENODYNE/SCD SLEEVE CALF MEDIUM

## (undated) DEVICE — SOL IRR BAG NS 0.9% 3000ML

## (undated) DEVICE — WARMING BLANKET UPPER ADULT

## (undated) DEVICE — SOL IRR POUR H2O 500ML

## (undated) DEVICE — TROCAR COVIDIEN VERSAONE BLADED FIXATION 11MM STANDARD

## (undated) DEVICE — DISSECTOR ENDOSCOPIC KITTNER SINGLE TIP

## (undated) DEVICE — SYR LUER LOK 20CC

## (undated) DEVICE — BASIN SET SINGLE

## (undated) DEVICE — DRAPE TOWEL 1010

## (undated) DEVICE — TROCAR COVIDIEN VERSAPORT BLADELESS OPTICAL 5MM STANDARD

## (undated) DEVICE — TUBING HYDRO-SURG PLUS IRRIGATOR W SMOKEVAC & PROBE

## (undated) DEVICE — TROCAR COVIDIEN VERSAONE BLUNT TIP HASSAN 12MM

## (undated) DEVICE — LIA-ESU FORCE FX T2E29332EX: Type: DURABLE MEDICAL EQUIPMENT

## (undated) DEVICE — PACK GENERAL LAPAROSCOPY

## (undated) DEVICE — DRAPE TOWEL BLUE 17" X 24"

## (undated) DEVICE — BAG SPECIMEN RETRIEVAL ENDOBAG 3X6"

## (undated) DEVICE — SUT VICRYL 0 27" UR-6

## (undated) DEVICE — CATH ANGIO 14G X 3.25"

## (undated) DEVICE — SUT MONOCRYL 4-0 27" PS-2 UNDYED

## (undated) DEVICE — TIP METZENBAUM SCISSOR MONOPOLAR ENDOCUT (ORANGE)

## (undated) DEVICE — DRAPE COVER SNAP 36X30"